# Patient Record
Sex: FEMALE | Race: OTHER | Employment: FULL TIME | URBAN - METROPOLITAN AREA
[De-identification: names, ages, dates, MRNs, and addresses within clinical notes are randomized per-mention and may not be internally consistent; named-entity substitution may affect disease eponyms.]

---

## 2017-03-22 ENCOUNTER — OFFICE VISIT (OUTPATIENT)
Dept: FAMILY MEDICINE CLINIC | Age: 32
End: 2017-03-22
Payer: COMMERCIAL

## 2017-03-22 VITALS
HEIGHT: 62 IN | WEIGHT: 180 LBS | BODY MASS INDEX: 33.13 KG/M2 | RESPIRATION RATE: 18 BRPM | TEMPERATURE: 97.6 F | HEART RATE: 78 BPM | DIASTOLIC BLOOD PRESSURE: 72 MMHG | OXYGEN SATURATION: 99 % | SYSTOLIC BLOOD PRESSURE: 103 MMHG

## 2017-03-22 DIAGNOSIS — R73.01 IMPAIRED FASTING GLUCOSE: ICD-10-CM

## 2017-03-22 DIAGNOSIS — E66.9 OBESITY (BMI 30.0-34.9): ICD-10-CM

## 2017-03-22 DIAGNOSIS — R63.5 UNINTENDED WEIGHT GAIN: Primary | ICD-10-CM

## 2017-03-22 DIAGNOSIS — G89.29 CHRONIC PAIN OF LEFT ANKLE: ICD-10-CM

## 2017-03-22 DIAGNOSIS — Z11.4 SCREENING FOR HIV (HUMAN IMMUNODEFICIENCY VIRUS): ICD-10-CM

## 2017-03-22 DIAGNOSIS — M25.572 CHRONIC PAIN OF LEFT ANKLE: ICD-10-CM

## 2017-03-22 DIAGNOSIS — M89.9 BONE DISORDER: ICD-10-CM

## 2017-03-22 DIAGNOSIS — M12.572 TRAUMATIC ARTHRITIS OF ANKLE, LEFT: ICD-10-CM

## 2017-03-22 PROBLEM — M12.579 TRAUMATIC ARTHRITIS OF ANKLE: Status: ACTIVE | Noted: 2017-03-22

## 2017-03-22 PROCEDURE — 99214 OFFICE O/P EST MOD 30 MIN: CPT | Performed by: FAMILY MEDICINE

## 2017-03-22 RX ORDER — METFORMIN HYDROCHLORIDE 500 MG/1
500 TABLET, EXTENDED RELEASE ORAL
Qty: 30 TABLET | Refills: 3 | Status: SHIPPED | OUTPATIENT
Start: 2017-03-22 | End: 2018-06-12 | Stop reason: ALTCHOICE

## 2017-03-22 ASSESSMENT — ENCOUNTER SYMPTOMS
SHORTNESS OF BREATH: 0
CONSTIPATION: 0
COUGH: 0
DIARRHEA: 0
CHEST TIGHTNESS: 0
NAUSEA: 0
ABDOMINAL PAIN: 0
WHEEZING: 0
ABDOMINAL DISTENTION: 0
VOMITING: 0

## 2017-11-29 DIAGNOSIS — M25.572 CHRONIC PAIN OF LEFT ANKLE: ICD-10-CM

## 2017-11-29 DIAGNOSIS — G89.29 CHRONIC PAIN OF LEFT ANKLE: ICD-10-CM

## 2017-11-29 DIAGNOSIS — M25.472 LEFT ANKLE SWELLING: ICD-10-CM

## 2017-11-29 RX ORDER — IBUPROFEN 800 MG/1
800 TABLET ORAL EVERY 8 HOURS PRN
Qty: 90 TABLET | Refills: 0 | Status: SHIPPED | OUTPATIENT
Start: 2017-11-29 | End: 2018-06-12 | Stop reason: SDUPTHER

## 2017-11-29 NOTE — TELEPHONE ENCOUNTER
----- Message from Sarah Cisse sent at 11/29/2017  2:20 PM EST -----  Contact: patient  Pt is requesting a refill on Ibuprofen.     Walker Ramus on [facility]    Patient home # [phone]

## 2017-12-08 ENCOUNTER — OFFICE VISIT (OUTPATIENT)
Dept: FAMILY MEDICINE CLINIC | Age: 32
End: 2017-12-08
Payer: COMMERCIAL

## 2017-12-08 ENCOUNTER — TELEPHONE (OUTPATIENT)
Dept: FAMILY MEDICINE CLINIC | Age: 32
End: 2017-12-08

## 2017-12-08 VITALS
OXYGEN SATURATION: 100 % | SYSTOLIC BLOOD PRESSURE: 110 MMHG | BODY MASS INDEX: 30.52 KG/M2 | RESPIRATION RATE: 20 BRPM | WEIGHT: 178.8 LBS | HEIGHT: 64 IN | HEART RATE: 60 BPM | TEMPERATURE: 97.1 F | DIASTOLIC BLOOD PRESSURE: 80 MMHG

## 2017-12-08 DIAGNOSIS — Z00.00 ANNUAL PHYSICAL EXAM: Primary | ICD-10-CM

## 2017-12-08 PROCEDURE — 99395 PREV VISIT EST AGE 18-39: CPT | Performed by: NURSE PRACTITIONER

## 2017-12-08 ASSESSMENT — ENCOUNTER SYMPTOMS
BLOOD IN STOOL: 0
ABDOMINAL PAIN: 0
SHORTNESS OF BREATH: 0

## 2017-12-08 NOTE — PROGRESS NOTES
Visit Information    Have you changed or started any medications since your last visit including any over-the-counter medicines, vitamins, or herbal medicines? no   Have you stopped taking any of your medications? Is so, why? -  no  Are you having any side effects from any of your medications? - no    Have you seen any other physician or provider since your last visit?  no   Have you had any other diagnostic tests since your last visit?  no   Have you been seen in the emergency room and/or had an admission in a hospital since we last saw you?  no   Have you had your routine dental cleaning in the past 6 months?  yes -      Do you have an active [employer] account? If no, what is the barrier?   Yes    Patient Care Team:  Rufina George MD as PCP - General (Family Medicine)  Le Wagner as Obstetrician (Certified Nurse Midwife)  Panchito Hammond MD as Surgeon (Orthopedic Surgery)    Medical History Review  Past Medical, Family, and Social History reviewed and does contribute to the patient presenting condition    Health Maintenance   Topic Date Due    HIV screen  02/15/2001    DTaP/Tdap/Td vaccine (1 - Tdap) 02/15/2005    Flu vaccine (1) 09/01/2017    Cervical cancer screen  06/01/2019
110/80   03/22/17 103/72   12/21/16 113/73     /80 Comment: manual  Pulse 60   Temp 97.1 °F (36.2 °C) (Tympanic)   Resp 20   Ht 5' 3.5\" (1.613 m)   Wt 178 lb 12.8 oz (81.1 kg)   LMP 12/05/2017 (Exact Date)   SpO2 100%   BMI 31.18 kg/m²   Lab Results   Component Value Date    WBC 5.6 12/23/2016    HGB 13.8 12/23/2016    HCT 41.0 12/23/2016     12/23/2016    CHOL 173 12/23/2016    TRIG 77 12/23/2016    HDL 44 12/23/2016    ALT 15 12/23/2016    AST 15 12/23/2016     12/23/2016    K 3.9 12/23/2016     12/23/2016    CREATININE 0.55 12/23/2016    BUN 12 12/23/2016    CO2 23 12/23/2016    TSH 0.85 12/23/2016    LABA1C 5.3 12/23/2016    LABMICR 5 02/14/2015     Lab Results   Component Value Date    CALCIUM 9.1 12/23/2016     Lab Results   Component Value Date    LDLCHOLESTEROL 114 12/23/2016         1. Annual physical exam  - Lipid Panel; Future  - Basic Metabolic Panel; Future  - Hemoglobin A1C; Future  - orders ordered last visit reprinted out. Requested Prescriptions      No prescriptions requested or ordered in this encounter       There are no discontinued medications. Domitila Pino received counseling on the following healthy behaviors: nutrition, exercise and weight management  Reviewed prior labs and health maintenance. Continue current medications, diet and exercise. Discussed use, benefit, and side effects of prescribed medications. Barriers to medication compliance addressed. Patient given educational materials - see patient instructions. All patient questions answered. Patient voiced understanding.

## 2017-12-11 ENCOUNTER — HOSPITAL ENCOUNTER (OUTPATIENT)
Age: 32
Discharge: HOME | End: 2017-12-11
Payer: COMMERCIAL

## 2017-12-11 DIAGNOSIS — M12.572 TRAUMATIC ARTHRITIS OF ANKLE, LEFT: ICD-10-CM

## 2017-12-11 DIAGNOSIS — Z11.4 SCREENING FOR HIV (HUMAN IMMUNODEFICIENCY VIRUS): ICD-10-CM

## 2017-12-11 DIAGNOSIS — E55.9 VITAMIN D DEFICIENCY: Primary | ICD-10-CM

## 2017-12-11 DIAGNOSIS — M89.9 BONE DISORDER: ICD-10-CM

## 2017-12-11 DIAGNOSIS — Z00.00 ANNUAL PHYSICAL EXAM: ICD-10-CM

## 2017-12-11 LAB
ANION GAP SERPL CALCULATED.3IONS-SCNC: 12 MMOL/L (ref 9–17)
BUN BLDV-MCNC: 9 MG/DL (ref 6–20)
BUN/CREAT BLD: ABNORMAL (ref 9–20)
CALCIUM SERPL-MCNC: 9.4 MG/DL (ref 8.6–10.4)
CHLORIDE BLD-SCNC: 104 MMOL/L (ref 98–107)
CHOLESTEROL/HDL RATIO: 5.3
CHOLESTEROL: 201 MG/DL
CO2: 25 MMOL/L (ref 20–31)
CREAT SERPL-MCNC: 0.54 MG/DL (ref 0.5–0.9)
ESTIMATED AVERAGE GLUCOSE: 105 MG/DL
GFR AFRICAN AMERICAN: >60 ML/MIN
GFR NON-AFRICAN AMERICAN: >60 ML/MIN
GFR SERPL CREATININE-BSD FRML MDRD: ABNORMAL ML/MIN/{1.73_M2}
GFR SERPL CREATININE-BSD FRML MDRD: ABNORMAL ML/MIN/{1.73_M2}
GLUCOSE BLD-MCNC: 109 MG/DL (ref 70–99)
HBA1C MFR BLD: 5.3 % (ref 4–6)
HDLC SERPL-MCNC: 38 MG/DL
HIV AG/AB: NONREACTIVE
LDL CHOLESTEROL: 115 MG/DL (ref 0–130)
POTASSIUM SERPL-SCNC: 4.3 MMOL/L (ref 3.7–5.3)
SODIUM BLD-SCNC: 141 MMOL/L (ref 135–144)
TRIGL SERPL-MCNC: 241 MG/DL
VITAMIN D 25-HYDROXY: 14.8 NG/ML (ref 30–100)
VLDLC SERPL CALC-MCNC: ABNORMAL MG/DL (ref 1–30)

## 2017-12-11 PROCEDURE — 36415 COLL VENOUS BLD VENIPUNCTURE: CPT

## 2017-12-11 PROCEDURE — 80061 LIPID PANEL: CPT

## 2017-12-11 PROCEDURE — 80048 BASIC METABOLIC PNL TOTAL CA: CPT

## 2017-12-11 PROCEDURE — 83036 HEMOGLOBIN GLYCOSYLATED A1C: CPT

## 2017-12-11 PROCEDURE — 82306 VITAMIN D 25 HYDROXY: CPT

## 2017-12-11 PROCEDURE — 87389 HIV-1 AG W/HIV-1&-2 AB AG IA: CPT

## 2017-12-11 RX ORDER — ERGOCALCIFEROL 1.25 MG/1
50000 CAPSULE ORAL WEEKLY
Qty: 4 CAPSULE | Refills: 2 | Status: SHIPPED | OUTPATIENT
Start: 2017-12-11 | End: 2018-06-12 | Stop reason: ALTCHOICE

## 2018-06-12 ENCOUNTER — OFFICE VISIT (OUTPATIENT)
Dept: FAMILY MEDICINE CLINIC | Age: 33
End: 2018-06-12
Payer: COMMERCIAL

## 2018-06-12 ENCOUNTER — HOSPITAL ENCOUNTER (OUTPATIENT)
Dept: ULTRASOUND IMAGING | Age: 33
Discharge: HOME | End: 2018-06-14
Payer: COMMERCIAL

## 2018-06-12 VITALS
WEIGHT: 164.8 LBS | HEART RATE: 78 BPM | OXYGEN SATURATION: 97 % | TEMPERATURE: 97.5 F | BODY MASS INDEX: 30.33 KG/M2 | SYSTOLIC BLOOD PRESSURE: 111 MMHG | HEIGHT: 62 IN | DIASTOLIC BLOOD PRESSURE: 74 MMHG

## 2018-06-12 DIAGNOSIS — R73.9 HYPERGLYCEMIA: ICD-10-CM

## 2018-06-12 DIAGNOSIS — D17.1 LIPOMA OF TORSO: Primary | ICD-10-CM

## 2018-06-12 DIAGNOSIS — G89.29 CHRONIC PAIN OF LEFT ANKLE: ICD-10-CM

## 2018-06-12 DIAGNOSIS — M25.572 CHRONIC PAIN OF LEFT ANKLE: ICD-10-CM

## 2018-06-12 DIAGNOSIS — Z23 NEED FOR DIPHTHERIA-TETANUS-PERTUSSIS (TDAP) VACCINE: ICD-10-CM

## 2018-06-12 DIAGNOSIS — D17.1 LIPOMA OF TORSO: ICD-10-CM

## 2018-06-12 LAB — HBA1C MFR BLD: 5.2 %

## 2018-06-12 PROCEDURE — 90471 IMMUNIZATION ADMIN: CPT | Performed by: FAMILY MEDICINE

## 2018-06-12 PROCEDURE — 99213 OFFICE O/P EST LOW 20 MIN: CPT | Performed by: FAMILY MEDICINE

## 2018-06-12 PROCEDURE — 76705 ECHO EXAM OF ABDOMEN: CPT

## 2018-06-12 PROCEDURE — 83036 HEMOGLOBIN GLYCOSYLATED A1C: CPT | Performed by: FAMILY MEDICINE

## 2018-06-12 PROCEDURE — 90715 TDAP VACCINE 7 YRS/> IM: CPT | Performed by: FAMILY MEDICINE

## 2018-06-12 RX ORDER — IBUPROFEN 800 MG/1
800 TABLET ORAL EVERY 8 HOURS PRN
Qty: 90 TABLET | Refills: 0 | Status: SHIPPED | OUTPATIENT
Start: 2018-06-12 | End: 2019-02-22 | Stop reason: SDUPTHER

## 2018-06-12 ASSESSMENT — PATIENT HEALTH QUESTIONNAIRE - PHQ9
2. FEELING DOWN, DEPRESSED OR HOPELESS: 0
SUM OF ALL RESPONSES TO PHQ QUESTIONS 1-9: 0
1. LITTLE INTEREST OR PLEASURE IN DOING THINGS: 0
SUM OF ALL RESPONSES TO PHQ9 QUESTIONS 1 & 2: 0

## 2018-06-12 ASSESSMENT — ENCOUNTER SYMPTOMS
ABDOMINAL PAIN: 0
SHORTNESS OF BREATH: 0
COUGH: 0
ABDOMINAL DISTENTION: 0
CHEST TIGHTNESS: 0

## 2018-06-13 PROBLEM — M89.9 BONE DISORDER: Status: RESOLVED | Noted: 2017-03-22 | Resolved: 2018-06-13

## 2018-06-13 PROBLEM — D17.1 LIPOMA OF TORSO: Status: ACTIVE | Noted: 2018-06-13

## 2018-07-17 ENCOUNTER — ANESTHESIA (OUTPATIENT)
Dept: OPERATING ROOM | Age: 33
End: 2018-07-17
Payer: COMMERCIAL

## 2018-07-17 ENCOUNTER — HOSPITAL ENCOUNTER (OUTPATIENT)
Age: 33
Setting detail: OUTPATIENT SURGERY
Discharge: HOME | End: 2018-07-17
Attending: SURGERY | Admitting: SURGERY
Payer: COMMERCIAL

## 2018-07-17 ENCOUNTER — ANESTHESIA EVENT (OUTPATIENT)
Dept: OPERATING ROOM | Age: 33
End: 2018-07-17
Payer: COMMERCIAL

## 2018-07-17 VITALS
WEIGHT: 160 LBS | HEIGHT: 62 IN | HEART RATE: 61 BPM | TEMPERATURE: 96.6 F | SYSTOLIC BLOOD PRESSURE: 113 MMHG | OXYGEN SATURATION: 97 % | RESPIRATION RATE: 13 BRPM | BODY MASS INDEX: 29.44 KG/M2 | DIASTOLIC BLOOD PRESSURE: 68 MMHG

## 2018-07-17 VITALS — TEMPERATURE: 95 F | DIASTOLIC BLOOD PRESSURE: 49 MMHG | SYSTOLIC BLOOD PRESSURE: 87 MMHG | OXYGEN SATURATION: 92 %

## 2018-07-17 DIAGNOSIS — D17.1 LIPOMA OF TORSO: Primary | ICD-10-CM

## 2018-07-17 LAB
-: NORMAL
HCG, PREGNANCY URINE (POC): NEGATIVE

## 2018-07-17 PROCEDURE — 2720000003 HC MISC SUTURE/STAPLES/RELOADS/ETC: Performed by: SURGERY

## 2018-07-17 PROCEDURE — 6360000002 HC RX W HCPCS: Performed by: ANESTHESIOLOGY

## 2018-07-17 PROCEDURE — 3600000002 HC SURGERY LEVEL 2 BASE: Performed by: SURGERY

## 2018-07-17 PROCEDURE — 2500000003 HC RX 250 WO HCPCS: Performed by: NURSE ANESTHETIST, CERTIFIED REGISTERED

## 2018-07-17 PROCEDURE — 2709999900 HC NON-CHARGEABLE SUPPLY: Performed by: SURGERY

## 2018-07-17 PROCEDURE — 6370000000 HC RX 637 (ALT 250 FOR IP): Performed by: ANESTHESIOLOGY

## 2018-07-17 PROCEDURE — 6360000002 HC RX W HCPCS: Performed by: SURGERY

## 2018-07-17 PROCEDURE — 7100000031 HC ASPR PHASE II RECOVERY - ADDTL 15 MIN: Performed by: SURGERY

## 2018-07-17 PROCEDURE — 88304 TISSUE EXAM BY PATHOLOGIST: CPT

## 2018-07-17 PROCEDURE — 7100000030 HC ASPR PHASE II RECOVERY - FIRST 15 MIN: Performed by: SURGERY

## 2018-07-17 PROCEDURE — 3600000012 HC SURGERY LEVEL 2 ADDTL 15MIN: Performed by: SURGERY

## 2018-07-17 PROCEDURE — 2580000003 HC RX 258: Performed by: SURGERY

## 2018-07-17 PROCEDURE — 6360000002 HC RX W HCPCS: Performed by: NURSE ANESTHETIST, CERTIFIED REGISTERED

## 2018-07-17 PROCEDURE — 7100000000 HC PACU RECOVERY - FIRST 15 MIN: Performed by: SURGERY

## 2018-07-17 PROCEDURE — 3700000000 HC ANESTHESIA ATTENDED CARE: Performed by: SURGERY

## 2018-07-17 PROCEDURE — 3700000001 HC ADD 15 MINUTES (ANESTHESIA): Performed by: SURGERY

## 2018-07-17 PROCEDURE — 7100000001 HC PACU RECOVERY - ADDTL 15 MIN: Performed by: SURGERY

## 2018-07-17 PROCEDURE — 2580000003 HC RX 258: Performed by: NURSE ANESTHETIST, CERTIFIED REGISTERED

## 2018-07-17 PROCEDURE — 84703 CHORIONIC GONADOTROPIN ASSAY: CPT

## 2018-07-17 PROCEDURE — 2500000003 HC RX 250 WO HCPCS: Performed by: SURGERY

## 2018-07-17 RX ORDER — SODIUM CHLORIDE, SODIUM LACTATE, POTASSIUM CHLORIDE, CALCIUM CHLORIDE 600; 310; 30; 20 MG/100ML; MG/100ML; MG/100ML; MG/100ML
INJECTION, SOLUTION INTRAVENOUS CONTINUOUS PRN
Status: DISCONTINUED | OUTPATIENT
Start: 2018-07-17 | End: 2018-07-17 | Stop reason: SDUPTHER

## 2018-07-17 RX ORDER — PROPOFOL 10 MG/ML
INJECTION, EMULSION INTRAVENOUS PRN
Status: DISCONTINUED | OUTPATIENT
Start: 2018-07-17 | End: 2018-07-17 | Stop reason: SDUPTHER

## 2018-07-17 RX ORDER — HYDROCODONE BITARTRATE AND ACETAMINOPHEN 5; 325 MG/1; MG/1
1 TABLET ORAL EVERY 6 HOURS PRN
Qty: 20 TABLET | Refills: 0 | Status: SHIPPED | OUTPATIENT
Start: 2018-07-17 | End: 2018-07-24

## 2018-07-17 RX ORDER — LABETALOL HYDROCHLORIDE 5 MG/ML
5 INJECTION, SOLUTION INTRAVENOUS EVERY 10 MIN PRN
Status: DISCONTINUED | OUTPATIENT
Start: 2018-07-17 | End: 2018-07-17 | Stop reason: HOSPADM

## 2018-07-17 RX ORDER — FENTANYL CITRATE 50 UG/ML
INJECTION, SOLUTION INTRAMUSCULAR; INTRAVENOUS PRN
Status: DISCONTINUED | OUTPATIENT
Start: 2018-07-17 | End: 2018-07-17 | Stop reason: SDUPTHER

## 2018-07-17 RX ORDER — ONDANSETRON 2 MG/ML
4 INJECTION INTRAMUSCULAR; INTRAVENOUS
Status: DISCONTINUED | OUTPATIENT
Start: 2018-07-17 | End: 2018-07-17 | Stop reason: HOSPADM

## 2018-07-17 RX ORDER — DIPHENHYDRAMINE HYDROCHLORIDE 50 MG/ML
12.5 INJECTION INTRAMUSCULAR; INTRAVENOUS
Status: DISCONTINUED | OUTPATIENT
Start: 2018-07-17 | End: 2018-07-17 | Stop reason: HOSPADM

## 2018-07-17 RX ORDER — LIDOCAINE HYDROCHLORIDE 10 MG/ML
INJECTION, SOLUTION EPIDURAL; INFILTRATION; INTRACAUDAL; PERINEURAL PRN
Status: DISCONTINUED | OUTPATIENT
Start: 2018-07-17 | End: 2018-07-17 | Stop reason: SDUPTHER

## 2018-07-17 RX ORDER — MIDAZOLAM HYDROCHLORIDE 1 MG/ML
INJECTION INTRAMUSCULAR; INTRAVENOUS PRN
Status: DISCONTINUED | OUTPATIENT
Start: 2018-07-17 | End: 2018-07-17 | Stop reason: SDUPTHER

## 2018-07-17 RX ORDER — MORPHINE SULFATE 2 MG/ML
2 INJECTION, SOLUTION INTRAMUSCULAR; INTRAVENOUS EVERY 5 MIN PRN
Status: DISCONTINUED | OUTPATIENT
Start: 2018-07-17 | End: 2018-07-17 | Stop reason: HOSPADM

## 2018-07-17 RX ORDER — CEFAZOLIN SODIUM 1 G/3ML
INJECTION, POWDER, FOR SOLUTION INTRAMUSCULAR; INTRAVENOUS
Status: DISCONTINUED
Start: 2018-07-17 | End: 2018-07-17 | Stop reason: HOSPADM

## 2018-07-17 RX ORDER — DEXAMETHASONE SODIUM PHOSPHATE 4 MG/ML
INJECTION, SOLUTION INTRA-ARTICULAR; INTRALESIONAL; INTRAMUSCULAR; INTRAVENOUS; SOFT TISSUE PRN
Status: DISCONTINUED | OUTPATIENT
Start: 2018-07-17 | End: 2018-07-17 | Stop reason: SDUPTHER

## 2018-07-17 RX ORDER — ONDANSETRON 2 MG/ML
INJECTION INTRAMUSCULAR; INTRAVENOUS PRN
Status: DISCONTINUED | OUTPATIENT
Start: 2018-07-17 | End: 2018-07-17 | Stop reason: SDUPTHER

## 2018-07-17 RX ORDER — HYDROCODONE BITARTRATE AND ACETAMINOPHEN 5; 325 MG/1; MG/1
1 TABLET ORAL ONCE
Status: COMPLETED | OUTPATIENT
Start: 2018-07-17 | End: 2018-07-17

## 2018-07-17 RX ORDER — MEPERIDINE HYDROCHLORIDE 50 MG/ML
12.5 INJECTION INTRAMUSCULAR; INTRAVENOUS; SUBCUTANEOUS EVERY 5 MIN PRN
Status: DISCONTINUED | OUTPATIENT
Start: 2018-07-17 | End: 2018-07-17 | Stop reason: HOSPADM

## 2018-07-17 RX ORDER — SODIUM CHLORIDE, SODIUM LACTATE, POTASSIUM CHLORIDE, CALCIUM CHLORIDE 600; 310; 30; 20 MG/100ML; MG/100ML; MG/100ML; MG/100ML
INJECTION, SOLUTION INTRAVENOUS CONTINUOUS
Status: DISCONTINUED | OUTPATIENT
Start: 2018-07-17 | End: 2018-07-17 | Stop reason: HOSPADM

## 2018-07-17 RX ORDER — BUPIVACAINE HYDROCHLORIDE 5 MG/ML
INJECTION, SOLUTION EPIDURAL; INTRACAUDAL PRN
Status: DISCONTINUED | OUTPATIENT
Start: 2018-07-17 | End: 2018-07-17 | Stop reason: HOSPADM

## 2018-07-17 RX ADMIN — LIDOCAINE HYDROCHLORIDE 50 MG: 10 INJECTION, SOLUTION EPIDURAL; INFILTRATION; INTRACAUDAL; PERINEURAL at 10:32

## 2018-07-17 RX ADMIN — ONDANSETRON 4 MG: 2 INJECTION INTRAMUSCULAR; INTRAVENOUS at 10:39

## 2018-07-17 RX ADMIN — PROPOFOL 200 MG: 10 INJECTION, EMULSION INTRAVENOUS at 10:32

## 2018-07-17 RX ADMIN — SODIUM CHLORIDE, POTASSIUM CHLORIDE, SODIUM LACTATE AND CALCIUM CHLORIDE: 600; 310; 30; 20 INJECTION, SOLUTION INTRAVENOUS at 10:27

## 2018-07-17 RX ADMIN — FENTANYL CITRATE 100 MCG: 50 INJECTION, SOLUTION INTRAMUSCULAR; INTRAVENOUS at 10:32

## 2018-07-17 RX ADMIN — SODIUM CHLORIDE, POTASSIUM CHLORIDE, SODIUM LACTATE AND CALCIUM CHLORIDE: 600; 310; 30; 20 INJECTION, SOLUTION INTRAVENOUS at 09:20

## 2018-07-17 RX ADMIN — SODIUM CHLORIDE, POTASSIUM CHLORIDE, SODIUM LACTATE AND CALCIUM CHLORIDE: 600; 310; 30; 20 INJECTION, SOLUTION INTRAVENOUS at 10:56

## 2018-07-17 RX ADMIN — MIDAZOLAM 2 MG: 1 INJECTION INTRAMUSCULAR; INTRAVENOUS at 10:27

## 2018-07-17 RX ADMIN — DEXAMETHASONE SODIUM PHOSPHATE 12 MG: 4 INJECTION, SOLUTION INTRAMUSCULAR; INTRAVENOUS at 10:39

## 2018-07-17 RX ADMIN — HYDROCODONE BITARTRATE AND ACETAMINOPHEN 1 TABLET: 5; 325 TABLET ORAL at 12:15

## 2018-07-17 RX ADMIN — HYDROMORPHONE HYDROCHLORIDE 0.5 MG: 1 INJECTION, SOLUTION INTRAMUSCULAR; INTRAVENOUS; SUBCUTANEOUS at 11:41

## 2018-07-17 RX ADMIN — Medication 2 G: at 10:35

## 2018-07-17 ASSESSMENT — PULMONARY FUNCTION TESTS
PIF_VALUE: 14
PIF_VALUE: 4
PIF_VALUE: 17
PIF_VALUE: 1
PIF_VALUE: 15
PIF_VALUE: 2
PIF_VALUE: 18
PIF_VALUE: 17
PIF_VALUE: 14
PIF_VALUE: 24
PIF_VALUE: 15
PIF_VALUE: 15
PIF_VALUE: 16
PIF_VALUE: 18
PIF_VALUE: 16
PIF_VALUE: 17
PIF_VALUE: 15
PIF_VALUE: 15
PIF_VALUE: 14
PIF_VALUE: 1
PIF_VALUE: 9
PIF_VALUE: 19
PIF_VALUE: 17
PIF_VALUE: 1
PIF_VALUE: 18
PIF_VALUE: 15
PIF_VALUE: 1
PIF_VALUE: 14
PIF_VALUE: 29
PIF_VALUE: 15
PIF_VALUE: 16
PIF_VALUE: 15
PIF_VALUE: 0
PIF_VALUE: 10

## 2018-07-17 ASSESSMENT — PAIN DESCRIPTION - PAIN TYPE
TYPE: SURGICAL PAIN
TYPE: SURGICAL PAIN

## 2018-07-17 ASSESSMENT — PAIN SCALES - GENERAL
PAINLEVEL_OUTOF10: 5
PAINLEVEL_OUTOF10: 6
PAINLEVEL_OUTOF10: 0
PAINLEVEL_OUTOF10: 5
PAINLEVEL_OUTOF10: 0

## 2018-07-17 ASSESSMENT — ENCOUNTER SYMPTOMS
STRIDOR: 0
SHORTNESS OF BREATH: 0

## 2018-07-17 ASSESSMENT — LIFESTYLE VARIABLES: SMOKING_STATUS: 0

## 2018-07-17 ASSESSMENT — PAIN DESCRIPTION - LOCATION: LOCATION: ABDOMEN

## 2018-07-17 ASSESSMENT — PAIN DESCRIPTION - DESCRIPTORS: DESCRIPTORS: DISCOMFORT

## 2018-07-17 ASSESSMENT — PAIN - FUNCTIONAL ASSESSMENT: PAIN_FUNCTIONAL_ASSESSMENT: 0-10

## 2018-07-17 NOTE — H&P
HISTORY and [address]       NAME:  Milka Jim  MRN: [id number]   YOB: 1985   Date: 7/17/2018   Age: 29 y.o. Gender: female       COMPLAINT AND PRESENT HISTORY:     Milka Jim is 29 y.o.,  female, here for [address] Right. Pt has a lump/mass to the right  Side of abdomen/flank area that she has had for approx 10 years now. Patient reports noticing the mass due to enlargement around 4 years ago, especially after a weight loss of 14 lbs. Pt reports that the mass has progressively larger and is non- painful. No recent falls or trauma. No redness, swelling or rashes. Pt denies any other symptoms. PAST MEDICAL HISTORY     Past Medical History:   Diagnosis Date    Calcaneus fracture, left 04/2015    Chronic pain of left ankle 12/21/2016    Left ankle swelling 12/21/2016    Low grade squamous intraepithelial lesion (LGSIL) on cervicovaginal cytologic smear 12/20/2016    Last PAP 6/1/16 in [address] is within normal limits     Navicular fracture, foot 04/2015    Non morbid obesity due to excess calories 12/21/2016    Obesity (BMI 30.0-34.9) 12/21/2016    Unintended weight gain 12/21/2016         SURGICAL HISTORY       Past Surgical History:   Procedure Laterality Date    FOOT SURGERY Left 04/2015    GALLBLADDER SURGERY  2010         SOCIAL HISTORY       Social History     Social History    Marital status: Single     Spouse name: N/A    Number of children: N/A    Years of education: N/A     Social History Main Topics    Smoking status: Never Smoker    Smokeless tobacco: Never Used    Alcohol use No    Drug use: No    Sexual activity: Not on file     Other Topics Concern    Not on file     Social History Narrative    No narrative on file           REVIEW OF SYSTEMS      No Known Allergies    No current facility-administered medications on file prior to encounter.       Current Outpatient Prescriptions on File Prior to

## 2018-07-17 NOTE — ANESTHESIA PRE PROCEDURE
Department of Anesthesiology  Preprocedure Note       Name:  Naida Mejia   Age:  29 y.o.  :  1985                                          MRN:  [id number]         Date:  2018      Surgeon: Ila Merino):  Fatimah Robin DO    Procedure: Procedure(s):  EXCISION LIPOMA ABDOMINAL WALL    Medications prior to admission:   Prior to Admission medications    Medication Sig Start Date End Date Taking? Authorizing Provider   ibuprofen (ADVIL;MOTRIN) 800 MG tablet Take 1 tablet by mouth every 8 hours as needed for Pain (with food) 18  Yes Walter Booker MD       Current medications:    Current Facility-Administered Medications   Medication Dose Route Frequency Provider Last Rate Last Dose    lactated ringers infusion   Intravenous Continuous Fatimah Robin  mL/hr at 18 0920      ceFAZolin (ANCEF) 2 g in dextrose 5 % 50 mL IVPB  2 g Intravenous Once Fatimah Robin DO           Allergies:  No Known Allergies    Problem List:    Patient Active Problem List   Diagnosis Code    Low grade squamous intraepithelial lesion (LGSIL) on cervicovaginal cytologic smear R87.612, R87.622    Left ankle swelling M25.472    Obesity (BMI 30.0-34. 9) E66.9    Chronic pain of left ankle M25.572, G89.29    Hyperglycemia R73.9    Impaired fasting glucose R73.01    Traumatic arthritis of ankle M12.579    Vitamin D deficiency E55.9    Lipoma of torso right flank D17.1       Past Medical History:        Diagnosis Date    Calcaneus fracture, left 2015    Chronic pain of left ankle 2016    Left ankle swelling 2016    Low grade squamous intraepithelial lesion (LGSIL) on cervicovaginal cytologic smear 2016    Last PAP 16 in [address] is within normal limits     Navicular fracture, foot 2015    Non morbid obesity due to excess calories 2016    Obesity (BMI 30.0-34.9) 2016    Unintended weight gain 2016       Past Surgical History:        Procedure

## 2018-07-19 LAB — SURGICAL PATHOLOGY REPORT: NORMAL

## 2018-09-12 ENCOUNTER — OFFICE VISIT (OUTPATIENT)
Dept: FAMILY MEDICINE CLINIC | Age: 33
End: 2018-09-12
Payer: COMMERCIAL

## 2018-09-12 VITALS
HEART RATE: 70 BPM | BODY MASS INDEX: 31.65 KG/M2 | DIASTOLIC BLOOD PRESSURE: 72 MMHG | SYSTOLIC BLOOD PRESSURE: 102 MMHG | WEIGHT: 172 LBS | HEIGHT: 62 IN | OXYGEN SATURATION: 100 %

## 2018-09-12 DIAGNOSIS — E66.9 OBESITY (BMI 30.0-34.9): ICD-10-CM

## 2018-09-12 DIAGNOSIS — G89.29 CHRONIC PAIN OF LEFT ANKLE: ICD-10-CM

## 2018-09-12 DIAGNOSIS — R73.9 HYPERGLYCEMIA: ICD-10-CM

## 2018-09-12 DIAGNOSIS — R73.01 IMPAIRED FASTING GLUCOSE: ICD-10-CM

## 2018-09-12 DIAGNOSIS — E55.9 VITAMIN D DEFICIENCY: ICD-10-CM

## 2018-09-12 DIAGNOSIS — M25.472 LEFT ANKLE SWELLING: ICD-10-CM

## 2018-09-12 DIAGNOSIS — M25.572 CHRONIC PAIN OF LEFT ANKLE: ICD-10-CM

## 2018-09-12 DIAGNOSIS — M12.572 TRAUMATIC ARTHRITIS OF LEFT ANKLE: Primary | ICD-10-CM

## 2018-09-12 PROCEDURE — 99214 OFFICE O/P EST MOD 30 MIN: CPT | Performed by: FAMILY MEDICINE

## 2018-09-12 RX ORDER — METFORMIN HYDROCHLORIDE 500 MG/1
500 TABLET, EXTENDED RELEASE ORAL
Qty: 30 TABLET | Refills: 3 | Status: SHIPPED | OUTPATIENT
Start: 2018-09-12 | End: 2020-05-25

## 2018-09-12 RX ORDER — CHOLECALCIFEROL (VITAMIN D3) 50 MCG
2000 TABLET ORAL DAILY
Qty: 30 TABLET | Refills: 3 | Status: SHIPPED | OUTPATIENT
Start: 2018-09-12 | End: 2021-03-18 | Stop reason: SDUPTHER

## 2018-09-12 RX ORDER — LIDOCAINE 40 MG/G
CREAM TOPICAL
Qty: 120 G | Refills: 3 | Status: SHIPPED | OUTPATIENT
Start: 2018-09-12 | End: 2020-05-25

## 2018-09-12 ASSESSMENT — ENCOUNTER SYMPTOMS
ABDOMINAL DISTENTION: 0
CONSTIPATION: 0
VOMITING: 0
CHEST TIGHTNESS: 0
DIARRHEA: 0
WHEEZING: 0
COUGH: 0
ABDOMINAL PAIN: 0
SHORTNESS OF BREATH: 0
NAUSEA: 0

## 2018-09-12 NOTE — PROGRESS NOTES
cannot exercise, but has been eating healthier. Both parents have diabetes. Her brother has prediabetes. She tried keto diet before, and she lost a lot of weight, now she gained the weight back  Body mass index is 31.46 kg/m². Wt Readings from Last 3 Encounters:   09/12/18 172 lb (78 kg)   07/17/18 160 lb (72.6 kg)   06/12/18 164 lb 12.8 oz (74.8 kg)     CT 12/9/2016 with multiple changes and possible collection    [address]  \"IMPRESSION:    Talonavicular effusion distending the dorsal joint capsule with fluid accumulation, localized phlegmon, and/or inflammatory changes adjacent to the distended capsule and course of the extensor tendons over the dorsal aspect of the hindfoot. Correlate for   any localized signs of infection or inflammatory process    The process appears to be extend proximally and medially along the medial aspect of the talonavicular joint where there is some prominent heterotopic ossification or several small joint bodies with similar changes seen more along the plantar lateral   aspect of the healed navicular fracture fragment    Hardware in place reducing the old navicular and cuboid fractures with chronic remodeling and several small free bone fragments and/or heterotopic ossification seen.  Mild to moderate arthritic changes and irregularity of the articular surfaces at the   adjacent calcaneocuboid and calcaneonavicular and talonavicular joints, also prominent arthritic changes seen along the middle subtalar joint involving the middle facet and sustentaculum    Sclerotic appearance of the dorsal and lateral extent of the reticular, unclear if that relates to healed fracture or possible AVN, less likely chronic osteomyelitis    Grossly maintained ankle mortise and tibial plafond and talar dome and structural ligaments of the ankle with only mild effusion or capsular thickening greater medially    Grossly maintained mid foot Lisfranc articulation and Psychiatric/Behavioral: Negative for dysphoric mood and sleep disturbance. The patient is not nervous/anxious.          -vital signs stable and within normal limits except Obesity per BMI. /72   Pulse 70   Ht 5' 2\" (1.575 m)   Wt 172 lb (78 kg)   SpO2 100%   BMI 31.46 kg/m²        Physical Exam   Constitutional: She is oriented to person, place, and time. She appears well-developed and well-nourished. No distress. HENT:   Head: Normocephalic and atraumatic. Mouth/Throat: Oropharynx is clear and moist. No oropharyngeal exudate. Eyes: Conjunctivae and EOM are normal. Right eye exhibits no discharge. Left eye exhibits no discharge. No scleral icterus. Neck: Normal range of motion. Neck supple. No thyromegaly present. Cardiovascular: Normal rate, regular rhythm, normal heart sounds and intact distal pulses. Pulmonary/Chest: Effort normal and breath sounds normal. No respiratory distress. She has no wheezes. She has no rales. She exhibits no tenderness. Abdominal: Soft. Bowel sounds are normal. She exhibits no distension. There is no hepatosplenomegaly. There is no tenderness. Obese abdomen. Musculoskeletal: She exhibits tenderness. She exhibits no edema. Left ankle: She exhibits decreased range of motion and swelling. Tenderness. Lateral malleolus tenderness found. Left foot: There is decreased range of motion, tenderness, bony tenderness and deformity. On the dorsum of the left foot, noticed old surgical scar. Deformity and prominence of the dorsum of the foot noted. Neurological: She is alert and oriented to person, place, and time. No cranial nerve deficit. She exhibits normal muscle tone. Skin: Skin is warm and dry. No rash noted. She is not diaphoretic. Psychiatric: Her behavior is normal. Judgment and thought content normal. Her mood appears anxious. Nursing note and vitals reviewed.       Most recent labs reviewed with the patient and all questions fully answered. Mild hyperglycemia  A1c within normal limits   hyperlipidemia  hypertriglyceridemia  Vitamin D low          Lab Results   Component Value Date    WBC 5.6 12/23/2016    HGB 13.8 12/23/2016    HCT 41.0 12/23/2016    MCV 88.5 12/23/2016     12/23/2016       Lab Results   Component Value Date     12/11/2017    K 4.3 12/11/2017     12/11/2017    CO2 25 12/11/2017    BUN 9 12/11/2017    CREATININE 0.54 12/11/2017    GLUCOSE 109 12/11/2017    GLUCOSE 83 11/19/2011    CALCIUM 9.4 12/11/2017        Lab Results   Component Value Date    LABA1C 5.2 06/12/2018     Lab Results   Component Value Date     12/11/2017         Lab Results   Component Value Date    ALT 15 12/23/2016    AST 15 12/23/2016    ALKPHOS 79 12/23/2016    BILITOT 0.41 12/23/2016       Lab Results   Component Value Date    TSH 0.85 12/23/2016       Lab Results   Component Value Date    CHOL 201 (H) 12/11/2017    CHOL 173 12/23/2016    CHOL 123 02/14/2015     Lab Results   Component Value Date    TRIG 241 (H) 12/11/2017    TRIG 77 12/23/2016    TRIG 41 02/14/2015     Lab Results   Component Value Date    HDL 38 (L) 12/11/2017    HDL 44 12/23/2016    HDL 42 02/14/2015     Lab Results   Component Value Date    LDLCHOLESTEROL 115 12/11/2017    LDLCHOLESTEROL 114 12/23/2016    LDLCHOLESTEROL 73 02/14/2015       Lab Results   Component Value Date    CHOLHDLRATIO 5.3 (H) 12/11/2017    CHOLHDLRATIO 3.9 12/23/2016    CHOLHDLRATIO 2.9 02/14/2015       Lab Results   Component Value Date    LABA1C 5.2 06/12/2018       Lab Results   Component Value Date    WGSBXAPR23 414 02/14/2015       No results found for: FOLATE    Lab Results   Component Value Date    IRON 41 02/14/2015    TIBC 316 02/14/2015    FERRITIN 13 02/14/2015       Lab Results   Component Value Date    VITD25 14.8 (L) 12/11/2017         ASSESSMENT AND PLAN      1. Traumatic arthritis of left ankle    - Handicap Placard MISC; by Does not apply route .  Can't walk greater than 200 feet. Expires in 5 years. Dispense: 1 each; Refill: 0  - [facility]  - diclofenac sodium (VOLTAREN) 1 % GEL; Apply 2 g topically 4 times daily as needed for Pain  Dispense: 1 Tube; Refill: 3  - lidocaine (LMX) 4 % cream; Apply 2-3 times a day as needed for pain  Dispense: 120 g; Refill: 3  - [facility] Anastasio Bloch, DPM  - XR FOOT LEFT (MIN 3 VIEWS); Future  - XR ANKLE LEFT (MIN 3 VIEWS); Future    2. Left ankle swelling  - Handicap Placard MISC; by Does not apply route . Can't walk greater than 200 feet. Expires in 5 years. Dispense: 1 each; Refill: 0  - [facility]  - diclofenac sodium (VOLTAREN) 1 % GEL; Apply 2 g topically 4 times daily as needed for Pain  Dispense: 1 Tube; Refill: 3  - lidocaine (LMX) 4 % cream; Apply 2-3 times a day as needed for pain  Dispense: 120 g; Refill: 3  - [facility]ast[facility] Bl[facility], DPM  - XR FOOT LEFT (MIN 3 VIEWS); Future  - XR ANKLE LEFT (MIN 3 VIEWS); Future    3. Chronic pain of left ankle  - Handicap Placard MISC; by Does not apply route . Can't walk greater than 200 feet. Expires in 5 years. Dispense: 1 each; Refill: 0  - [facility]  - diclofenac sodium (VOLTAREN) 1 % GEL; Apply 2 g topically 4 times daily as needed for Pain  Dispense: 1 Tube; Refill: 3  - lidocaine (LMX) 4 % cream; Apply 2-3 times a day as needed for pain  Dispense: 120 g; Refill: 3  - [facility] D[facility] Anastasio Bloch, DPM  - XR FOOT LEFT (MIN 3 VIEWS); Future  - XR ANKLE LEFT (MIN 3 VIEWS); Future    4. Obesity (BMI 30.0-34.9)  worsening    - start metFORMIN (GLUCOPHAGE-XR) 500 MG extended release tablet; Take 1 tablet by mouth daily (with breakfast)  Dispense: 30 tablet; Refill: 3    Patient was asked about her current diet and exercise habits, and personalized advice was provided regarding recommended lifestyle changes.   Patient's comorbid health conditions associated with elevated BMI discussed conventional weight loss and Healthy diet and regular exercise. BP: 102/72 Blood pressure is normal. Treatment plan consists of No treatment change needed. Hyperlipidemia, life style modification: diet and exercise. Lab Results   Component Value Date    LDLCHOLESTEROL 115 12/11/2017    (goal LDL reduction with dx if diabetes is 50% LDL reduction)        Negative depression screening. PHQ Scores 6/12/2018 12/21/2016   PHQ2 Score 0 0   PHQ9 Score 0 0     Interpretation of Total Score Depression Severity: 1-4 = Minimal depression, 5-9 = Mild depression, 10-14 = Moderate depression, 15-19 = Moderately severe depression, 20-27 = Severe depression      The patient's past medical, surgical, social, and family history as well as her   current medications and allergies were reviewed as documented in today's encounter. Medications, labs, diagnostic studies, consultations and follow-up as documented in this encounter. Return in about 3 months (around 12/12/2018) for ANNUAL EXAM/physical 30 mins. Patient was seen with total face to face time of  25 minutes. More than 50% of this visit was counseling and education. Future Appointments  Date Time Provider Verna Diop   9/24/2018 3:15 PM Jessica Benitez, PT [facility] [address] [place] [address]   12/13/2018 3:00 PM Marcello Oliva MD [facility]     This note was completed by using the assistance of a speech-recognition program. However, inadvertent computerized transcription errors may be present. Although every effort was made to ensure accuracy, no guarantees can be provided that every mistake has been identified and corrected by editing.     Electronically signed by Marcello Oliva MD on 9/17/2018 at 10:15 PM

## 2018-09-12 NOTE — PROGRESS NOTES
Visit Information    Have you changed or started any medications since your last visit including any over-the-counter medicines, vitamins, or herbal medicines? no   Are you having any side effects from any of your medications? -  no  Have you stopped taking any of your medications? Is so, why? -  no    Have you seen any other physician or provider since your last visit? Yes - Records Obtained  Have you had any other diagnostic tests since your last visit? Yes - Records Obtained  Have you been seen in the emergency room and/or had an admission to a hospital since we last saw you? No  Have you had your routine dental cleaning in the past 6 months? no    Have you activated your [employer] account? If not, what are your barriers?  Yes     Patient Care Team:  Michelle Lazaro MD as PCP - General (Family Medicine)  Rebecca Dejesus as Obstetrician (Certified Nurse Midwife)  Claudio Morales MD as Surgeon (Orthopedic Surgery)    Medical History Review  Past Medical, Family, and Social History reviewed and does contribute to the patient presenting condition    Health Maintenance   Topic Date Due    Flu vaccine (1) 09/01/2018    Cervical cancer screen  06/01/2019    A1C test (Diabetic or Prediabetic)  06/12/2019    DTaP/Tdap/Td vaccine (2 - Td) 06/12/2028    HIV screen  Completed

## 2018-09-24 ENCOUNTER — HOSPITAL ENCOUNTER (OUTPATIENT)
Dept: PHYSICAL THERAPY | Age: 33
Setting detail: THERAPIES SERIES
Discharge: HOME | End: 2018-09-24
Payer: COMMERCIAL

## 2018-09-24 PROCEDURE — 97161 PT EVAL LOW COMPLEX 20 MIN: CPT

## 2018-09-24 PROCEDURE — G0283 ELEC STIM OTHER THAN WOUND: HCPCS

## 2018-09-24 ASSESSMENT — PAIN DESCRIPTION - ORIENTATION: ORIENTATION: LEFT

## 2018-09-24 ASSESSMENT — PAIN SCALES - GENERAL: PAINLEVEL_OUTOF10: 10

## 2018-09-24 ASSESSMENT — PAIN DESCRIPTION - LOCATION: LOCATION: ANKLE

## 2018-09-24 ASSESSMENT — PAIN DESCRIPTION - DESCRIPTORS: DESCRIPTORS: SHARP

## 2018-09-24 ASSESSMENT — PAIN DESCRIPTION - FREQUENCY: FREQUENCY: INTERMITTENT

## 2018-09-24 NOTE — PROGRESS NOTES
[] [address] [facility] and  Therapy  [address]  Phone: [phone]  Fax: [phone]  [] [facility] and Therapy  [address] [place] [address] [address]  Phone: [phone]  Fax: [phone]  [] [address] [place] [address] and Therapy  [address] [facility]  Phone: [phone]  Fax: [phone]      THERAPY RESPONSIBILITY OF CARE TRANSFER FORM       PATIENT NAME: Tsering Bernal  MRN: [id number]   : 1985      TRANSFERRING FACILITY:    [] [facility]   [x] 1 [facility]   []  [facility]   [] [facility]   [] [facility]     [] [facility]    [] Other:       ACCEPTING FACILITY   [] [facility]   [x] 1 [facility]   []  [facility]   [] [facility]   [] [facility]      [] [facility]    [] Other:          REASON FOR TRANSFER: orders for dry needling      TRANSFER OF CARE:    I am transferring the care of the above patient to: Janki Cali, JORGE Ochoa PT  2018      ACCEPTANCE OF CARE:     I am accepting the care of the above patient.  Janki Cali, PT

## 2018-09-24 NOTE — PROGRESS NOTES
patient can walk more than 20 min  Short term goal 2: increase L ankle AROM to full   Short term goal 3: increase L ankle 5/5  Short term goal 4: indep with HEP  Long term goals  Time Frame for Long term goals : 12 visits  Long term goal 1: improve optimal from 19/20 to 4/20(walk long distance 4 hopping 5 running 5 jumping 5)  Patient Goals   Patient goals : relieve L ankle pain     Treatment Charges: Minutes Units   []  Ultrasound     [x]  Electrical-Stim 20 1   []  Iontophoresis     []  Traction     []  Massage       [x]  Eval 20 1   []  Gait     []  Ther Exercise       []  Manual Therapy       []  Ther Activities       []  Aquatics     []  Vasopneumatic Device     []  Neuro Re-Ed       []  Other       Total Treatment Time: 40 2        Therapy Time   Individual Concurrent Group Co-treatment   Time In 1520         Time Out 1600         Minutes 40           Patient Goals:relieve L ankle pain    Comments/Assessment:    Rehab Potential:  [x] Good  [] Fair  [] Poor   Suggested Professional Referral:  [x] No  [] Yes:  Barriers to Goal Achievement:  [] No  [x] Yes:chronic  Domestic Concerns:  [x] No  [] Yes:    Treatment Plan:  [x] Therapeutic Exercise    [x] Modalities:  [] Therapeutic Activity    [] Ultrasound  [x] Electrical Stimulation  [] Gait Training     []Massage       [] Lumbar/Cervical Traction  [] Neuromuscular Re-education [x] Cold/hotpack [x] Other: Dry Needling  [] Instruction in HEP     [] Work Conditioning                                         [] Manual Therapy             [] Aquatic Therapy               [] Iontophoresis: 4 mg/mL      Dexamethasone Sodium      Phosphate 40-80mAmin (Allergies Reviewed)     Frequency:        2   X/wk x      6    wk's      [x] Plans/Goals, Risk/Benefits discussed with pt/family  Comprehension of Education [x] yes  [] Needs Review  Pt/Family Education: [x] Verbal  [x] Demo  [] Written    More objective information is available upon request.  Thank you for this referral.        Medicare/Regulatory Requirements:  I have reviewed this plan of care and certify a need for   Medically necessary rehabilitation services.   [] Physician Signature    Date:     Electronically signed by: Baldomero Cazares, [address] [facility] [address] [facility]heatherOste[facility] Hospital [facility] [place], [address] [facility]  Phone [phone]  Fax [phone]

## 2018-09-26 ENCOUNTER — HOSPITAL ENCOUNTER (OUTPATIENT)
Dept: PHYSICAL THERAPY | Age: 33
Setting detail: THERAPIES SERIES
Discharge: HOME | End: 2018-09-26
Payer: COMMERCIAL

## 2018-09-26 PROCEDURE — 97110 THERAPEUTIC EXERCISES: CPT

## 2018-09-26 PROCEDURE — 97140 MANUAL THERAPY 1/> REGIONS: CPT

## 2018-10-01 ENCOUNTER — HOSPITAL ENCOUNTER (OUTPATIENT)
Dept: PHYSICAL THERAPY | Age: 33
Setting detail: THERAPIES SERIES
Discharge: HOME | End: 2018-10-01
Payer: COMMERCIAL

## 2018-10-01 PROCEDURE — 97140 MANUAL THERAPY 1/> REGIONS: CPT

## 2018-10-01 PROCEDURE — 97110 THERAPEUTIC EXERCISES: CPT

## 2018-10-03 ENCOUNTER — HOSPITAL ENCOUNTER (OUTPATIENT)
Dept: PHYSICAL THERAPY | Age: 33
Setting detail: THERAPIES SERIES
Discharge: HOME | End: 2018-10-03
Payer: COMMERCIAL

## 2018-10-03 PROCEDURE — 97140 MANUAL THERAPY 1/> REGIONS: CPT

## 2018-10-03 PROCEDURE — 97110 THERAPEUTIC EXERCISES: CPT

## 2018-10-03 ASSESSMENT — PAIN DESCRIPTION - LOCATION: LOCATION: ANKLE

## 2018-10-03 ASSESSMENT — PAIN DESCRIPTION - FREQUENCY: FREQUENCY: INTERMITTENT

## 2018-10-03 ASSESSMENT — PAIN SCALES - GENERAL: PAINLEVEL_OUTOF10: 8

## 2018-10-03 ASSESSMENT — PAIN DESCRIPTION - ORIENTATION: ORIENTATION: LEFT

## 2018-10-03 ASSESSMENT — PAIN DESCRIPTION - DESCRIPTORS: DESCRIPTORS: SHARP

## 2018-10-05 ASSESSMENT — PAIN DESCRIPTION - LOCATION
LOCATION: ANKLE
LOCATION: ANKLE

## 2018-10-05 ASSESSMENT — PAIN DESCRIPTION - DESCRIPTORS: DESCRIPTORS: SHARP

## 2018-10-05 ASSESSMENT — PAIN DESCRIPTION - FREQUENCY: FREQUENCY: INTERMITTENT

## 2018-10-05 ASSESSMENT — PAIN DESCRIPTION - ORIENTATION
ORIENTATION: LEFT
ORIENTATION: LEFT

## 2018-10-05 ASSESSMENT — PAIN SCALES - GENERAL
PAINLEVEL_OUTOF10: 6
PAINLEVEL_OUTOF10: 4

## 2018-10-05 NOTE — PROGRESS NOTES
stiffness/weakness L ankle difficulty walking  Prognosis: Good  REQUIRES PT FOLLOW UP: Yes     Goals:  Short term goals  Time Frame for Short term goals: 6 visits  Short term goal 1: decrease pain L ankle by 50% so patient can walk more than 20 min  Short term goal 2: increase L ankle AROM to full   Short term goal 3: increase L ankle 5/5  Short term goal 4: indep with HEP  Long term goals  Time Frame for Long term goals : 12 visits  Long term goal 1: improve optimal from 19/20 to 4/20(walk long distance 4 hopping 5 running 5 jumping 5)  Patient Goals   Patient goals : relieve L ankle pain    Plan:    Plan  Times per week: 2x/week  Plan weeks: 6 weeks  Current Treatment Recommendations: Strengthening, ROM, Integrated Dry Needling, Modalities, Home Exercise Program  Plan Comment: patient to be transfered to [facility] Elena LPT for dry needling        Therapy Time   Individual Concurrent Group Co-treatment   Time In  1700         Time Out  1735         Minutes  35             Treatment Charges: Minutes Units   []  Ultrasound     []  Electrical-Stim     []  Iontophoresis     []  Traction     []  Massage       []  Eval     []  Gait     [x]  Ther Exercise 20   1   []  Manual Therapy  15 1    []  Ther Activities       []  Aquatics     []  Vasopneumatic Device     []  Neuro Re-Ed       []  Other       Total Treatment Time: 35  2            Librado Parker, PT

## 2018-10-08 ENCOUNTER — HOSPITAL ENCOUNTER (OUTPATIENT)
Dept: PHYSICAL THERAPY | Age: 33
Setting detail: THERAPIES SERIES
Discharge: HOME | End: 2018-10-08
Payer: COMMERCIAL

## 2018-10-08 PROCEDURE — 97110 THERAPEUTIC EXERCISES: CPT

## 2018-10-08 PROCEDURE — 97140 MANUAL THERAPY 1/> REGIONS: CPT

## 2018-10-10 ENCOUNTER — HOSPITAL ENCOUNTER (OUTPATIENT)
Dept: PHYSICAL THERAPY | Age: 33
Setting detail: THERAPIES SERIES
Discharge: HOME | End: 2018-10-10
Payer: COMMERCIAL

## 2018-10-10 PROCEDURE — 97140 MANUAL THERAPY 1/> REGIONS: CPT

## 2018-10-10 PROCEDURE — 97110 THERAPEUTIC EXERCISES: CPT

## 2018-10-12 NOTE — PROGRESS NOTES
Physical Therapy  Daily Treatment Note  Date: 10/10/2018  Patient Name: Pascual Aly  [facility]  :   1985     Subjective:   General  Chart Reviewed: Yes  Additional Pertinent Hx: pain L ankle since  when she broke it and had surgery  Family / Caregiver Present: No  Referring Practitioner: Dr Nikos Logan  PT Visit Information  Onset Date: 04/05/15  PT Insurance Information: BCBS  Total # of Visits Approved: 12  Total # of Visits to Date: 6  Subjective  Subjective: Patient with significantly more decreased WB today, moderate antalgic gait. Patient stated that she had increased symptoms \"due to the weather\" but was also more active at work and had to do more WB at work today. Patient is seeing a podiatrist/foot surgeon next week Friday for a second opinion. Patient told to bring information regarding this physician and when she was seeing him, plan PN to update this physician and referring physician on status. Pain Screening  Patient Currently in Pain: Yes  Pain Assessment  Pain Assessment: 0-10  Pain Level: 6 (8/10 with increased WB)  Pain Location: Ankle  Pain Orientation: Left  Patient's Stated Pain Goal: No pain  Vital Signs  Patient Currently in Pain: Yes       Treatment Activities:   Manual therapy  Joint mobilization: tibial anterior and posterior glide  PROM: toe flexion, ankle PF  Other: dry needling 1/2\" needles, deep fibular, tibial homeostatic points, symptomatic points along previous incisional area 3x each lateral to dorsal incision, just inferior to lateral incision  Exercises  Exercise 1: standing L heel cord stretch 3x30\"  Exercise 2: cross over toe extensor stretch 5 x 15\"  Exercise 3: cross over anterior ankle stretch 5 x 15\"  REVIEWED 4 way ankle purple 2x10, toe yoga 2x10, pop bottle stretch with patient pending pain levels.   Written HEP enclosed in chart.       Assessment:   Conditions Requiring Skilled Therapeutic Intervention  Body structures, Functions, Activity

## 2018-10-17 ENCOUNTER — HOSPITAL ENCOUNTER (OUTPATIENT)
Dept: PHYSICAL THERAPY | Age: 33
Setting detail: THERAPIES SERIES
Discharge: HOME | End: 2018-10-17
Payer: COMMERCIAL

## 2018-10-17 PROCEDURE — 97140 MANUAL THERAPY 1/> REGIONS: CPT

## 2018-10-17 PROCEDURE — 97110 THERAPEUTIC EXERCISES: CPT

## 2018-10-19 ENCOUNTER — HOSPITAL ENCOUNTER (OUTPATIENT)
Dept: GENERAL RADIOLOGY | Age: 33
Discharge: HOME | End: 2018-10-21
Payer: COMMERCIAL

## 2018-10-19 ENCOUNTER — HOSPITAL ENCOUNTER (OUTPATIENT)
Age: 33
Discharge: HOME | End: 2018-10-21
Payer: COMMERCIAL

## 2018-10-19 DIAGNOSIS — M12.572 TRAUMATIC ARTHRITIS OF LEFT ANKLE: ICD-10-CM

## 2018-10-19 DIAGNOSIS — G89.29 CHRONIC PAIN OF LEFT ANKLE: ICD-10-CM

## 2018-10-19 DIAGNOSIS — M25.572 CHRONIC PAIN OF LEFT ANKLE: ICD-10-CM

## 2018-10-19 DIAGNOSIS — M25.472 LEFT ANKLE SWELLING: ICD-10-CM

## 2018-10-19 PROCEDURE — 73610 X-RAY EXAM OF ANKLE: CPT

## 2018-10-19 PROCEDURE — 73630 X-RAY EXAM OF FOOT: CPT

## 2018-10-19 ASSESSMENT — PAIN DESCRIPTION - ORIENTATION: ORIENTATION: LEFT

## 2018-10-19 ASSESSMENT — PAIN DESCRIPTION - FREQUENCY: FREQUENCY: INTERMITTENT

## 2018-10-19 ASSESSMENT — PAIN DESCRIPTION - DESCRIPTORS: DESCRIPTORS: SHARP

## 2018-10-19 ASSESSMENT — PAIN DESCRIPTION - LOCATION: LOCATION: ANKLE

## 2018-10-19 ASSESSMENT — PAIN SCALES - GENERAL: PAINLEVEL_OUTOF10: 4

## 2019-02-22 ENCOUNTER — OFFICE VISIT (OUTPATIENT)
Dept: FAMILY MEDICINE CLINIC | Age: 34
End: 2019-02-22
Payer: COMMERCIAL

## 2019-02-22 VITALS
BODY MASS INDEX: 32.2 KG/M2 | WEIGHT: 175 LBS | DIASTOLIC BLOOD PRESSURE: 78 MMHG | SYSTOLIC BLOOD PRESSURE: 118 MMHG | OXYGEN SATURATION: 98 % | HEART RATE: 62 BPM | TEMPERATURE: 98.2 F | HEIGHT: 62 IN

## 2019-02-22 DIAGNOSIS — E78.5 HYPERLIPIDEMIA WITH TARGET LDL LESS THAN 100: ICD-10-CM

## 2019-02-22 DIAGNOSIS — G89.29 CHRONIC PAIN OF LEFT ANKLE: ICD-10-CM

## 2019-02-22 DIAGNOSIS — Z00.00 ANNUAL PHYSICAL EXAM: Primary | ICD-10-CM

## 2019-02-22 DIAGNOSIS — R63.5 UNINTENDED WEIGHT GAIN: ICD-10-CM

## 2019-02-22 DIAGNOSIS — R53.82 CHRONIC FATIGUE: ICD-10-CM

## 2019-02-22 DIAGNOSIS — R73.01 IMPAIRED FASTING GLUCOSE: ICD-10-CM

## 2019-02-22 DIAGNOSIS — M25.572 CHRONIC PAIN OF LEFT ANKLE: ICD-10-CM

## 2019-02-22 DIAGNOSIS — E55.9 VITAMIN D DEFICIENCY: ICD-10-CM

## 2019-02-22 PROBLEM — D17.1 LIPOMA OF TORSO: Status: RESOLVED | Noted: 2018-06-13 | Resolved: 2019-02-22

## 2019-02-22 LAB — HBA1C MFR BLD: 4.8 %

## 2019-02-22 PROCEDURE — 83036 HEMOGLOBIN GLYCOSYLATED A1C: CPT | Performed by: FAMILY MEDICINE

## 2019-02-22 PROCEDURE — 99395 PREV VISIT EST AGE 18-39: CPT | Performed by: FAMILY MEDICINE

## 2019-02-22 RX ORDER — IBUPROFEN 800 MG/1
800 TABLET ORAL EVERY 8 HOURS PRN
Qty: 90 TABLET | Refills: 0 | Status: SHIPPED | OUTPATIENT
Start: 2019-02-22 | End: 2020-05-27 | Stop reason: SDUPTHER

## 2019-02-22 ASSESSMENT — PATIENT HEALTH QUESTIONNAIRE - PHQ9
2. FEELING DOWN, DEPRESSED OR HOPELESS: 0
SUM OF ALL RESPONSES TO PHQ9 QUESTIONS 1 & 2: 0
SUM OF ALL RESPONSES TO PHQ QUESTIONS 1-9: 0
1. LITTLE INTEREST OR PLEASURE IN DOING THINGS: 0
SUM OF ALL RESPONSES TO PHQ QUESTIONS 1-9: 0

## 2019-02-22 ASSESSMENT — ENCOUNTER SYMPTOMS
WHEEZING: 0
SINUS PAIN: 0
COUGH: 0
RHINORRHEA: 0
CHEST TIGHTNESS: 0
CONSTIPATION: 0
DIARRHEA: 0
ABDOMINAL DISTENTION: 0
NAUSEA: 0
SHORTNESS OF BREATH: 0
ABDOMINAL PAIN: 0
BACK PAIN: 0
VOMITING: 0

## 2019-03-16 ENCOUNTER — HOSPITAL ENCOUNTER (OUTPATIENT)
Age: 34
Discharge: HOME | End: 2019-03-16
Payer: COMMERCIAL

## 2019-03-16 DIAGNOSIS — R63.5 UNINTENDED WEIGHT GAIN: ICD-10-CM

## 2019-03-16 DIAGNOSIS — E78.5 HYPERLIPIDEMIA WITH TARGET LDL LESS THAN 100: ICD-10-CM

## 2019-03-16 DIAGNOSIS — R53.82 CHRONIC FATIGUE: ICD-10-CM

## 2019-03-16 DIAGNOSIS — E55.9 VITAMIN D DEFICIENCY: ICD-10-CM

## 2019-03-16 DIAGNOSIS — K75.2 NONSPECIFIC REACTIVE HEPATITIS: Primary | ICD-10-CM

## 2019-03-16 LAB
ALBUMIN SERPL-MCNC: 4.2 G/DL (ref 3.5–5.2)
ALBUMIN/GLOBULIN RATIO: ABNORMAL (ref 1–2.5)
ALP BLD-CCNC: 84 U/L (ref 35–104)
ALT SERPL-CCNC: 41 U/L (ref 5–33)
ANION GAP SERPL CALCULATED.3IONS-SCNC: 9 MMOL/L (ref 9–17)
AST SERPL-CCNC: 24 U/L
BILIRUB SERPL-MCNC: 0.35 MG/DL (ref 0.3–1.2)
BUN BLDV-MCNC: 9 MG/DL (ref 6–20)
BUN/CREAT BLD: ABNORMAL (ref 9–20)
CALCIUM SERPL-MCNC: 9.6 MG/DL (ref 8.6–10.4)
CHLORIDE BLD-SCNC: 105 MMOL/L (ref 98–107)
CHOLESTEROL/HDL RATIO: 4.5
CHOLESTEROL: 174 MG/DL
CO2: 26 MMOL/L (ref 20–31)
CREAT SERPL-MCNC: 0.55 MG/DL (ref 0.5–0.9)
GFR AFRICAN AMERICAN: >60 ML/MIN
GFR NON-AFRICAN AMERICAN: >60 ML/MIN
GFR SERPL CREATININE-BSD FRML MDRD: ABNORMAL ML/MIN/{1.73_M2}
GFR SERPL CREATININE-BSD FRML MDRD: ABNORMAL ML/MIN/{1.73_M2}
GLUCOSE BLD-MCNC: 102 MG/DL (ref 70–99)
HCT VFR BLD CALC: 43.3 % (ref 36–46)
HDLC SERPL-MCNC: 39 MG/DL
HEMOGLOBIN: 14.6 G/DL (ref 12–16)
LDL CHOLESTEROL: 113 MG/DL (ref 0–130)
MCH RBC QN AUTO: 31.1 PG (ref 26–34)
MCHC RBC AUTO-ENTMCNC: 33.6 G/DL (ref 31–37)
MCV RBC AUTO: 92.6 FL (ref 80–100)
NRBC AUTOMATED: NORMAL PER 100 WBC
PDW BLD-RTO: 13 % (ref 11.5–14.9)
PLATELET # BLD: 245 K/UL (ref 150–450)
PMV BLD AUTO: 9.4 FL (ref 6–12)
POTASSIUM SERPL-SCNC: 4.4 MMOL/L (ref 3.7–5.3)
RBC # BLD: 4.68 M/UL (ref 4–5.2)
SODIUM BLD-SCNC: 140 MMOL/L (ref 135–144)
TOTAL PROTEIN: 7.6 G/DL (ref 6.4–8.3)
TRIGL SERPL-MCNC: 108 MG/DL
TSH SERPL DL<=0.05 MIU/L-ACNC: 1.24 MIU/L (ref 0.3–5)
VITAMIN D 25-HYDROXY: 17.5 NG/ML (ref 30–100)
VLDLC SERPL CALC-MCNC: ABNORMAL MG/DL (ref 1–30)
WBC # BLD: 4.3 K/UL (ref 3.5–11)

## 2019-03-16 PROCEDURE — 80053 COMPREHEN METABOLIC PANEL: CPT

## 2019-03-16 PROCEDURE — 80061 LIPID PANEL: CPT

## 2019-03-16 PROCEDURE — 82306 VITAMIN D 25 HYDROXY: CPT

## 2019-03-16 PROCEDURE — 85027 COMPLETE CBC AUTOMATED: CPT

## 2019-03-16 PROCEDURE — 36415 COLL VENOUS BLD VENIPUNCTURE: CPT

## 2019-03-16 PROCEDURE — 84443 ASSAY THYROID STIM HORMONE: CPT

## 2019-03-19 ENCOUNTER — TELEPHONE (OUTPATIENT)
Dept: FAMILY MEDICINE CLINIC | Age: 34
End: 2019-03-19

## 2019-03-19 DIAGNOSIS — E55.9 VITAMIN D DEFICIENCY: Primary | ICD-10-CM

## 2019-03-19 RX ORDER — ERGOCALCIFEROL 1.25 MG/1
50000 CAPSULE ORAL WEEKLY
Qty: 12 CAPSULE | Refills: 0 | Status: SHIPPED | OUTPATIENT
Start: 2019-03-19 | End: 2019-06-01 | Stop reason: SDUPTHER

## 2019-03-20 ENCOUNTER — HOSPITAL ENCOUNTER (OUTPATIENT)
Age: 34
Discharge: HOME | End: 2019-03-20
Payer: COMMERCIAL

## 2019-03-20 DIAGNOSIS — K75.2 NONSPECIFIC REACTIVE HEPATITIS: ICD-10-CM

## 2019-03-20 LAB
HAV IGM SER IA-ACNC: NONREACTIVE
HEPATITIS B CORE IGM ANTIBODY: NONREACTIVE
HEPATITIS B SURFACE ANTIGEN: NONREACTIVE
HEPATITIS C ANTIBODY: NONREACTIVE

## 2019-03-20 PROCEDURE — 80074 ACUTE HEPATITIS PANEL: CPT

## 2019-03-20 PROCEDURE — 36415 COLL VENOUS BLD VENIPUNCTURE: CPT

## 2019-03-23 ENCOUNTER — HOSPITAL ENCOUNTER (OUTPATIENT)
Dept: ULTRASOUND IMAGING | Age: 34
Discharge: HOME | End: 2019-03-25
Payer: COMMERCIAL

## 2019-03-23 DIAGNOSIS — K75.2 NONSPECIFIC REACTIVE HEPATITIS: ICD-10-CM

## 2019-03-23 PROCEDURE — 76705 ECHO EXAM OF ABDOMEN: CPT

## 2019-06-01 DIAGNOSIS — E55.9 VITAMIN D DEFICIENCY: ICD-10-CM

## 2019-06-03 RX ORDER — ERGOCALCIFEROL 1.25 MG/1
CAPSULE ORAL
Qty: 12 CAPSULE | Refills: 0 | Status: SHIPPED | OUTPATIENT
Start: 2019-06-03 | End: 2020-05-27 | Stop reason: ALTCHOICE

## 2019-06-03 NOTE — TELEPHONE ENCOUNTER
Please Approve or Refuse.   Send to Pharmacy per Pt's Request:      Next Visit Date:  Visit date not found   Last Visit Date: 2/22/2019    Hemoglobin A1C (%)   Date Value   02/22/2019 4.8   06/12/2018 5.2   12/11/2017 5.3             ( goal A1C is < 7)   BP Readings from Last 3 Encounters:   02/22/19 118/78   09/12/18 102/72   07/17/18 113/68          (goal 120/80)  BUN   Date Value Ref Range Status   03/16/2019 9 6 - 20 mg/dL Final     CREATININE   Date Value Ref Range Status   03/16/2019 0.55 0.50 - 0.90 mg/dL Final     Potassium   Date Value Ref Range Status   03/16/2019 4.4 3.7 - 5.3 mmol/L Final

## 2019-06-03 NOTE — TELEPHONE ENCOUNTER
Yes I did send on 3/19/19  She just called us after 2 mo! FYI  Notes recorded by Genaro Apodaca MD on 3/16/2019 at 8:21 PM EDT  ABNORMAL. Please notify patient. Vitamin D very low, new prescription for high-dose vitamin D weekly for 3 months sent at the pharmacy, needs to take it with food. Improved lipids  Mildly increased Blood Glucose, low carb diet advised, and stop pop! One liver test a bit high, will need more testing for her liver, I will order ultrasound liver and hepatitis panel.  Otherwise labs within normal limits

## 2020-05-25 ENCOUNTER — HOSPITAL ENCOUNTER (EMERGENCY)
Age: 35
Discharge: HOME | End: 2020-05-25
Attending: EMERGENCY MEDICINE
Payer: COMMERCIAL

## 2020-05-25 VITALS
DIASTOLIC BLOOD PRESSURE: 80 MMHG | HEIGHT: 62 IN | TEMPERATURE: 98.6 F | WEIGHT: 170 LBS | RESPIRATION RATE: 16 BRPM | SYSTOLIC BLOOD PRESSURE: 124 MMHG | BODY MASS INDEX: 31.28 KG/M2 | OXYGEN SATURATION: 100 % | HEART RATE: 74 BPM

## 2020-05-25 PROCEDURE — 6360000002 HC RX W HCPCS: Performed by: NURSE PRACTITIONER

## 2020-05-25 PROCEDURE — 90471 IMMUNIZATION ADMIN: CPT | Performed by: NURSE PRACTITIONER

## 2020-05-25 PROCEDURE — 99282 EMERGENCY DEPT VISIT SF MDM: CPT

## 2020-05-25 PROCEDURE — 90715 TDAP VACCINE 7 YRS/> IM: CPT | Performed by: NURSE PRACTITIONER

## 2020-05-25 RX ORDER — CIPROFLOXACIN 250 MG/1
250 TABLET, FILM COATED ORAL 2 TIMES DAILY
Qty: 14 TABLET | Refills: 0 | Status: SHIPPED | OUTPATIENT
Start: 2020-05-25 | End: 2020-06-01

## 2020-05-25 RX ADMIN — TETANUS TOXOID, REDUCED DIPHTHERIA TOXOID AND ACELLULAR PERTUSSIS VACCINE, ADSORBED 0.5 ML: 5; 2.5; 8; 8; 2.5 SUSPENSION INTRAMUSCULAR at 20:25

## 2020-05-25 ASSESSMENT — ENCOUNTER SYMPTOMS
VOMITING: 0
COLOR CHANGE: 1
SHORTNESS OF BREATH: 0
NAUSEA: 0
SINUS PRESSURE: 0
COUGH: 0

## 2020-05-25 ASSESSMENT — PAIN SCALES - GENERAL: PAINLEVEL_OUTOF10: 10

## 2020-05-26 ENCOUNTER — TELEPHONE (OUTPATIENT)
Dept: FAMILY MEDICINE CLINIC | Age: 35
End: 2020-05-26

## 2020-05-26 NOTE — ED PROVIDER NOTES
[facility] ED  EMERGENCY DEPARTMENT ENCOUNTER      Pt Name: rPashant Blanco  MRN: [id number]  [place] 1985  Date of evaluation: 5/25/20  CHIEF COMPLAINT       Chief Complaint   Patient presents with    Laceration     stepped on a nail     HISTORY OF PRESENT ILLNESS   Presents to the emergency room via private auto with puncture wound to the right foot. She states that yesterday she stepped on a nail which went through the rubber sole of her tennis shoe. Today she feels as if she has some swelling around the puncture site. No purulent drainage or fevers. She complains of throbbing pain. No additional injuries. The history is provided by the patient. REVIEW OF SYSTEMS     Review of Systems   Constitutional: Negative for activity change and fever. HENT: Negative for congestion and sinus pressure. Respiratory: Negative for cough and shortness of breath. Cardiovascular: Negative for chest pain and palpitations. Gastrointestinal: Negative for nausea and vomiting. Musculoskeletal: Positive for arthralgias and gait problem. Negative for myalgias. Skin: Positive for color change and wound. Neurological: Negative for dizziness and headaches. Psychiatric/Behavioral: Negative for confusion and decreased concentration.      PASTMEDICAL HISTORY     Past Medical History:   Diagnosis Date    Calcaneus fracture, left 04/2015    Chronic pain of left ankle 12/21/2016    Hyperlipidemia with target LDL less than 100 2/22/2019    Left ankle swelling 12/21/2016    Lipoma of torso right flank 6/13/2018    Low grade squamous intraepithelial lesion (LGSIL) on cervicovaginal cytologic smear 12/20/2016    Last PAP 6/1/16 in [facility] is within normal limits     Low grade squamous intraepithelial lesion (LGSIL) on cervicovaginal cytologic smear 12/20/2016    Last PAP 6/1/16 in [facility] is within normal limits     Navicular fracture, foot 04/2015    Non morbid obesity due to excess calories (BOOSTRIX) injection 0.5 mL    ciprofloxacin (CIPRO) 250 MG tablet     Sig: Take 1 tablet by mouth 2 times daily for 7 days     Dispense:  14 tablet     Refill:  0       FINAL IMPRESSION      1.  Puncture wound          DISPOSITION/PLAN   DISPOSITION Decision To Discharge 05/25/2020 08:30:54 PM      PATIENT REFERRED TO:   Bandar Orellana MD  [address] [place] [address].  [address] [place] [address]  [phone]    Schedule an appointment as soon as possible for a visit       [facility] ED  [address]  [phone]    If symptoms worsen      DISCHARGE MEDICATIONS:     New Prescriptions    CIPROFLOXACIN (CIPRO) 250 MG TABLET    Take 1 tablet by mouth 2 times daily for 7 days       CRITICAL CARE TIME       Please note that portions of this note were completed with a voice recognition program.      Catalina CANO [id number], APRN - CNP  05/25/20 2036

## 2020-05-26 NOTE — TELEPHONE ENCOUNTER
[address]  ED Follow up Call    Reason for ED visit:           Joaquín Rojas , this is Hiren Mcmanus from Dr. Nathan Shah office, just calling to see how you are doing after your recent ED visit. Did you receive discharge instructions? Yes  Do you understand the discharge instructions? Yes  Did the ED give you any new prescriptions? Yes  Were you able to fill your prescriptions? Yes      Do you have one of our red, yellow and green  Zone sheets that help you to determine when you should go to the ED? Yes      Do you need or want to make a follow up appt with your PCP? Yes    Do you have any further needs in the home i.e. Equipment?   No        FU appts/Provider:    Future Appointments   Date Time Provider Verna Diop   5/27/2020  8:45 AM ERIC Ureña CNP fp [facility]   6/3/2020  4:15 PM [facility]

## 2020-05-27 ENCOUNTER — OFFICE VISIT (OUTPATIENT)
Dept: FAMILY MEDICINE CLINIC | Age: 35
End: 2020-05-27
Payer: COMMERCIAL

## 2020-05-27 VITALS
HEART RATE: 67 BPM | BODY MASS INDEX: 32.2 KG/M2 | DIASTOLIC BLOOD PRESSURE: 86 MMHG | SYSTOLIC BLOOD PRESSURE: 124 MMHG | WEIGHT: 175 LBS | OXYGEN SATURATION: 98 % | HEIGHT: 62 IN

## 2020-05-27 LAB — HBA1C MFR BLD: 5.3 %

## 2020-05-27 PROCEDURE — 83036 HEMOGLOBIN GLYCOSYLATED A1C: CPT | Performed by: FAMILY MEDICINE

## 2020-05-27 PROCEDURE — 99214 OFFICE O/P EST MOD 30 MIN: CPT | Performed by: FAMILY MEDICINE

## 2020-05-27 RX ORDER — IBUPROFEN 800 MG/1
800 TABLET ORAL EVERY 8 HOURS PRN
Qty: 90 TABLET | Refills: 0 | Status: SHIPPED | OUTPATIENT
Start: 2020-05-27 | End: 2020-05-27 | Stop reason: SDUPTHER

## 2020-05-27 RX ORDER — IBUPROFEN 800 MG/1
800 TABLET ORAL EVERY 8 HOURS PRN
Qty: 90 TABLET | Refills: 0 | Status: SHIPPED | OUTPATIENT
Start: 2020-05-27 | End: 2020-09-01 | Stop reason: SDUPTHER

## 2020-05-27 SDOH — ECONOMIC STABILITY: FOOD INSECURITY: WITHIN THE PAST 12 MONTHS, YOU WORRIED THAT YOUR FOOD WOULD RUN OUT BEFORE YOU GOT MONEY TO BUY MORE.: NEVER TRUE

## 2020-05-27 SDOH — ECONOMIC STABILITY: INCOME INSECURITY: HOW HARD IS IT FOR YOU TO PAY FOR THE VERY BASICS LIKE FOOD, HOUSING, MEDICAL CARE, AND HEATING?: NOT HARD AT ALL

## 2020-05-27 SDOH — ECONOMIC STABILITY: FOOD INSECURITY: WITHIN THE PAST 12 MONTHS, THE FOOD YOU BOUGHT JUST DIDN'T LAST AND YOU DIDN'T HAVE MONEY TO GET MORE.: NEVER TRUE

## 2020-05-27 SDOH — ECONOMIC STABILITY: TRANSPORTATION INSECURITY
IN THE PAST 12 MONTHS, HAS THE LACK OF TRANSPORTATION KEPT YOU FROM MEDICAL APPOINTMENTS OR FROM GETTING MEDICATIONS?: NO

## 2020-05-27 SDOH — ECONOMIC STABILITY: TRANSPORTATION INSECURITY
IN THE PAST 12 MONTHS, HAS LACK OF TRANSPORTATION KEPT YOU FROM MEETINGS, WORK, OR FROM GETTING THINGS NEEDED FOR DAILY LIVING?: NO

## 2020-05-27 ASSESSMENT — PATIENT HEALTH QUESTIONNAIRE - PHQ9
SUM OF ALL RESPONSES TO PHQ9 QUESTIONS 1 & 2: 0
SUM OF ALL RESPONSES TO PHQ QUESTIONS 1-9: 0
2. FEELING DOWN, DEPRESSED OR HOPELESS: 0
1. LITTLE INTEREST OR PLEASURE IN DOING THINGS: 0
SUM OF ALL RESPONSES TO PHQ QUESTIONS 1-9: 0

## 2020-05-27 NOTE — PROGRESS NOTES
Visit Information    Have you changed or started any medications since your last visit including any over-the-counter medicines, vitamins, or herbal medicines? no   Are you having any side effects from any of your medications? -  no  Have you stopped taking any of your medications? Is so, why? -  no    Have you seen any other physician or provider since your last visit? No  Have you had any other diagnostic tests since your last visit? Yes - Records Obtained  Have you been seen in the emergency room and/or had an admission to a hospital since we last saw you? Yes - Records Obtained  Have you had your routine dental cleaning in the past 6 months? no    Have you activated your [employer] account? If not, what are your barriers?  Yes     Patient Care Team:  Cynthia Osullivan MD as PCP - General (Family Medicine)  Cynthia Osullivan MD as PCP - [facility]  Bruna Andersen as Obstetrician (Certified Nurse Midwife)  Joseph Montague MD as Surgeon (Orthopedic Surgery)    Medical History Review  Past Medical, Family, and Social History reviewed and does contribute to the patient presenting condition    Health Maintenance   Topic Date Due    Varicella vaccine (1 of 2 - 2-dose childhood series) 02/15/1987    Cervical cancer screen  06/01/2019    A1C test (Diabetic or Prediabetic)  02/22/2020    Flu vaccine (Season Ended) 09/01/2020    DTaP/Tdap/Td vaccine (3 - Td) 05/25/2030    HIV screen  Completed    Hepatitis A vaccine  Aged Out    Hepatitis B vaccine  Aged Out    Hib vaccine  Aged Out    Meningococcal (ACWY) vaccine  Aged Out    Pneumococcal 0-64 years Vaccine  Aged Out

## 2020-05-27 NOTE — PATIENT INSTRUCTIONS
pillow anytime you sit or lie down during the next 3 days. Try to keep it above the level of your heart. This will help reduce swelling. When should you call for help? Call your doctor now or seek immediate medical care if:  · You have new pain, or the pain gets worse. · The skin near the wound is cold or pale or changes color. · You have tingling, weakness, or numbness near the wound. · The wound starts to bleed, and blood soaks through the bandage. Oozing small amounts of blood is normal.  · You have symptoms of infection, such as:  ? Increased pain, swelling, warmth, or redness. ? Red streaks leading from the wound. ? Pus draining from the wound. ? A fever. Watch closely for changes in your health, and be sure to contact your doctor if:  · You do not get better as expected. Where can you learn more? Go to [email or web address]://[email or web address] org and sign in to your Fanmode account. Enter  in the [employer] box to learn more about \"Wound Check: Care Instructions. \"     If you do not have an account, please click on the \"Sign Up Now\" link. Current as of: June 26, 2019               Content Version: 12.5  © 9489-2522 Healthwise, Incorporated. Care instructions adapted under license by [facility] ([facility]). If you have questions about a medical condition or this instruction, always ask your healthcare professional. [place] [address] any warranty or liability for your use of this information.

## 2020-05-27 NOTE — PROGRESS NOTES
03/16/2019    AST 24 03/16/2019    ALKPHOS 84 03/16/2019    BILITOT 0.35 03/16/2019     Lab Results   Component Value Date    TSH 1.24 03/16/2019     Lab Results   Component Value Date    CHOL 174 03/16/2019    CHOL 201 (H) 12/11/2017    CHOL 173 12/23/2016     Lab Results   Component Value Date    TRIG 108 03/16/2019    TRIG 241 (H) 12/11/2017    TRIG 77 12/23/2016     Lab Results   Component Value Date    HDL 39 (L) 03/16/2019    HDL 38 (L) 12/11/2017    HDL 44 12/23/2016     Lab Results   Component Value Date    LDLCHOLESTEROL 113 03/16/2019    LDLCHOLESTEROL 115 12/11/2017    LDLCHOLESTEROL 114 12/23/2016     Lab Results   Component Value Date    CHOLHDLRATIO 4.5 03/16/2019    CHOLHDLRATIO 5.3 (H) 12/11/2017    CHOLHDLRATIO 3.9 12/23/2016     Lab Results   Component Value Date    LABA1C 5.3 05/27/2020      Lab Results   Component Value Date    ODCIQOHV96 414 02/14/2015     No results found for: FOLATE  Lab Results   Component Value Date    VITD25 17.5 (L) 03/16/2019     ASSESSMENT/PLAN:  1. Injury of right foot, subsequent encounter  Previous  Still swollen  Evaluate for FB or soft tissue injury  Off work for a few more days. - XR FOOT RIGHT (2 VIEWS); Future  - ibuprofen (ADVIL;MOTRIN) 800 MG tablet; Take 1 tablet by mouth every 8 hours as needed for Pain (with food)  Dispense: 90 tablet; Refill: 0    2. Puncture wound  On cipro  Get XR done    - XR FOOT RIGHT (2 VIEWS); Future  - ibuprofen (ADVIL;MOTRIN) 800 MG tablet; Take 1 tablet by mouth every 8 hours as needed for Pain (with food)  Dispense: 90 tablet; Refill: 0    3. Hyperlipidemia with target LDL less than 100  Ongoing  For lipid testing  - Lipid, Fasting; Future    4. Vitamin D deficiency  Ongoing  Due for vitamin D level test  - Vitamin D 25 Hydroxy; Future    5. Hyperglycemia  Valuate for diabetes  - POCT glycosylated hemoglobin (Hb A1C)    6.  Need for varicella vaccine  Recommended to check varicella titers  - Varicella Zoster Antibody, IgG; Future    7. Preventative health care  Recommended  - CBC Auto Differential; Future  - Comprehensive Metabolic Panel, Fasting; Future  - Urinalysis Reflex to Culture; Future    8. Screening for endocrine disorder  Recommended  - TSH without Reflex; Future     Orders Placed This Encounter   Procedures    XR FOOT RIGHT (2 VIEWS)     Standing Status:   Future     Standing Expiration Date:   5/27/2021     Order Specific Question:   Reason for exam:     Answer:   foot pain    CBC Auto Differential     Standing Status:   Future     Standing Expiration Date:   5/27/2021    Comprehensive Metabolic Panel, Fasting     Standing Status:   Future     Standing Expiration Date:   5/27/2021    Lipid, Fasting     Standing Status:   Future     Standing Expiration Date:   5/27/2021    Vitamin D 25 Hydroxy     Standing Status:   Future     Standing Expiration Date:   5/27/2021    Urinalysis Reflex to Culture     Standing Status:   Future     Standing Expiration Date:   5/27/2021     Order Specific Question:   SPECIFY(EX-CATH,MIDSTREAM,CYSTO,ETC)? Answer:   midstream    TSH without Reflex     Standing Status:   Future     Standing Expiration Date:   5/27/2021    Varicella Zoster Antibody, IgG     Standing Status:   Future     Standing Expiration Date:   5/27/2021    POCT glycosylated hemoglobin (Hb A1C)     Orders Placed This Encounter   Medications    ibuprofen (ADVIL;MOTRIN) 800 MG tablet     Sig: Take 1 tablet by mouth every 8 hours as needed for Pain (with food)     Dispense:  90 tablet     Refill:  0      Health Maintenance Due   Topic Date Due    Varicella vaccine (1 of 2 - 2-dose childhood series) 02/15/1987    Cervical cancer screen  06/01/2019    A1C test (Diabetic or Prediabetic)  02/22/2020      Return for Appt w/ Dr. Alyssa Atwood, Worsening Sx's.   Horacio Acevedo received counseling on the following healthy behaviors: nutrition, exercise and medication adherence  Reviewed prior labs and health maintenance  Continue current

## 2020-05-28 NOTE — ED PROVIDER NOTES
eMERGENCY dEPARTMENT eNCOUnter   Independent Attestation     Pt Name: Yasmine Mireles  MRN: [id number]  [place] 1985  Date of evaluation: 5/27/20     Yasmine Mireles is a 30 y.o. female with CC: Laceration (stepped on a nail)      Based on the medical record the care appears appropriate. I was personally available for consultation in the Emergency Department.     Lynda Guillen MD  Attending Emergency Physician                    Lynda Guillen MD  05/27/20 [id number]

## 2020-05-30 ENCOUNTER — HOSPITAL ENCOUNTER (OUTPATIENT)
Age: 35
Discharge: HOME | End: 2020-05-30
Payer: COMMERCIAL

## 2020-05-30 LAB
-: ABNORMAL
ABSOLUTE EOS #: 0.1 K/UL (ref 0–0.4)
ABSOLUTE IMMATURE GRANULOCYTE: NORMAL K/UL (ref 0–0.3)
ABSOLUTE LYMPH #: 1.5 K/UL (ref 1–4.8)
ABSOLUTE MONO #: 0.3 K/UL (ref 0.1–1.3)
ALBUMIN SERPL-MCNC: 4.3 G/DL (ref 3.5–5.2)
ALBUMIN/GLOBULIN RATIO: ABNORMAL (ref 1–2.5)
ALP BLD-CCNC: 79 U/L (ref 35–104)
ALT SERPL-CCNC: 31 U/L (ref 5–33)
AMORPHOUS: ABNORMAL
ANION GAP SERPL CALCULATED.3IONS-SCNC: 12 MMOL/L (ref 9–17)
AST SERPL-CCNC: 18 U/L
BACTERIA: ABNORMAL
BASOPHILS # BLD: 1 % (ref 0–2)
BASOPHILS ABSOLUTE: 0 K/UL (ref 0–0.2)
BILIRUB SERPL-MCNC: 0.3 MG/DL (ref 0.3–1.2)
BILIRUBIN URINE: NEGATIVE
BUN BLDV-MCNC: 11 MG/DL (ref 6–20)
BUN/CREAT BLD: ABNORMAL (ref 9–20)
CALCIUM SERPL-MCNC: 9.4 MG/DL (ref 8.6–10.4)
CASTS UA: ABNORMAL /LPF
CHLORIDE BLD-SCNC: 104 MMOL/L (ref 98–107)
CHOLESTEROL, FASTING: 184 MG/DL
CHOLESTEROL/HDL RATIO: 4.6
CO2: 22 MMOL/L (ref 20–31)
COLOR: YELLOW
COMMENT UA: ABNORMAL
CREAT SERPL-MCNC: 0.5 MG/DL (ref 0.5–0.9)
CRYSTALS, UA: ABNORMAL /HPF
DIFFERENTIAL TYPE: NORMAL
EOSINOPHILS RELATIVE PERCENT: 1 % (ref 0–4)
EPITHELIAL CELLS UA: ABNORMAL /HPF
GFR AFRICAN AMERICAN: >60 ML/MIN
GFR NON-AFRICAN AMERICAN: >60 ML/MIN
GFR SERPL CREATININE-BSD FRML MDRD: ABNORMAL ML/MIN/{1.73_M2}
GFR SERPL CREATININE-BSD FRML MDRD: ABNORMAL ML/MIN/{1.73_M2}
GLUCOSE FASTING: 102 MG/DL (ref 70–99)
GLUCOSE URINE: NEGATIVE
HCT VFR BLD CALC: 41.9 % (ref 36–46)
HDLC SERPL-MCNC: 40 MG/DL
HEMOGLOBIN: 14.1 G/DL (ref 12–16)
IMMATURE GRANULOCYTES: NORMAL %
KETONES, URINE: NEGATIVE
LDL CHOLESTEROL: 124 MG/DL (ref 0–130)
LEUKOCYTE ESTERASE, URINE: ABNORMAL
LYMPHOCYTES # BLD: 27 % (ref 24–44)
MCH RBC QN AUTO: 30.6 PG (ref 26–34)
MCHC RBC AUTO-ENTMCNC: 33.6 G/DL (ref 31–37)
MCV RBC AUTO: 91.1 FL (ref 80–100)
MONOCYTES # BLD: 6 % (ref 1–7)
MUCUS: ABNORMAL
NITRITE, URINE: NEGATIVE
NRBC AUTOMATED: NORMAL PER 100 WBC
OTHER OBSERVATIONS UA: ABNORMAL
PDW BLD-RTO: 13.3 % (ref 11.5–14.9)
PH UA: 6 (ref 5–8)
PLATELET # BLD: 252 K/UL (ref 150–450)
PLATELET ESTIMATE: NORMAL
PMV BLD AUTO: 9.3 FL (ref 6–12)
POTASSIUM SERPL-SCNC: 3.8 MMOL/L (ref 3.7–5.3)
PROTEIN UA: NEGATIVE
RBC # BLD: 4.6 M/UL (ref 4–5.2)
RBC # BLD: NORMAL 10*6/UL
RBC UA: ABNORMAL /HPF
RENAL EPITHELIAL, UA: ABNORMAL /HPF
SEG NEUTROPHILS: 65 % (ref 36–66)
SEGMENTED NEUTROPHILS ABSOLUTE COUNT: 3.6 K/UL (ref 1.3–9.1)
SODIUM BLD-SCNC: 138 MMOL/L (ref 135–144)
SPECIFIC GRAVITY UA: 1.02 (ref 1–1.03)
TOTAL PROTEIN: 7.7 G/DL (ref 6.4–8.3)
TRICHOMONAS: ABNORMAL
TRIGLYCERIDE, FASTING: 101 MG/DL
TSH SERPL DL<=0.05 MIU/L-ACNC: 1.19 MIU/L (ref 0.3–5)
TURBIDITY: CLEAR
URINE HGB: ABNORMAL
UROBILINOGEN, URINE: NORMAL
VITAMIN D 25-HYDROXY: 17.3 NG/ML (ref 30–100)
VLDLC SERPL CALC-MCNC: ABNORMAL MG/DL (ref 1–30)
WBC # BLD: 5.5 K/UL (ref 3.5–11)
WBC # BLD: NORMAL 10*3/UL
WBC UA: ABNORMAL /HPF
YEAST: ABNORMAL

## 2020-05-30 PROCEDURE — 86787 VARICELLA-ZOSTER ANTIBODY: CPT

## 2020-05-30 PROCEDURE — 81001 URINALYSIS AUTO W/SCOPE: CPT

## 2020-05-30 PROCEDURE — 87086 URINE CULTURE/COLONY COUNT: CPT

## 2020-05-30 PROCEDURE — 82306 VITAMIN D 25 HYDROXY: CPT

## 2020-05-30 PROCEDURE — 80061 LIPID PANEL: CPT

## 2020-05-30 PROCEDURE — 80053 COMPREHEN METABOLIC PANEL: CPT

## 2020-05-30 PROCEDURE — 84443 ASSAY THYROID STIM HORMONE: CPT

## 2020-05-30 PROCEDURE — 36415 COLL VENOUS BLD VENIPUNCTURE: CPT

## 2020-05-30 PROCEDURE — 85025 COMPLETE CBC W/AUTO DIFF WBC: CPT

## 2020-05-31 LAB
CULTURE: NO GROWTH
Lab: NORMAL
SPECIMEN DESCRIPTION: NORMAL

## 2020-05-31 RX ORDER — ERGOCALCIFEROL 1.25 MG/1
50000 CAPSULE ORAL WEEKLY
Qty: 12 CAPSULE | Refills: 0 | Status: SHIPPED | OUTPATIENT
Start: 2020-05-31 | End: 2021-03-18 | Stop reason: SDUPTHER

## 2020-06-01 LAB — VZV IGG SER QL IA: 2.19

## 2020-06-01 NOTE — RESULT ENCOUNTER NOTE
Mychart comment sent to patient.   Immune to chickenpox, normal  Future Appointments  6/3/2020   4:15 PM    Charles Vieira MD     [facility]          [address] [facility]

## 2020-06-09 ENCOUNTER — TELEPHONE (OUTPATIENT)
Dept: ADMINISTRATIVE | Age: 35
End: 2020-06-09

## 2020-09-01 ENCOUNTER — OFFICE VISIT (OUTPATIENT)
Dept: FAMILY MEDICINE CLINIC | Age: 35
End: 2020-09-01
Payer: COMMERCIAL

## 2020-09-01 VITALS
OXYGEN SATURATION: 98 % | BODY MASS INDEX: 30.43 KG/M2 | WEIGHT: 166.4 LBS | HEART RATE: 68 BPM | SYSTOLIC BLOOD PRESSURE: 132 MMHG | DIASTOLIC BLOOD PRESSURE: 84 MMHG | TEMPERATURE: 98.2 F

## 2020-09-01 PROCEDURE — 99395 PREV VISIT EST AGE 18-39: CPT | Performed by: FAMILY MEDICINE

## 2020-09-01 RX ORDER — IBUPROFEN 800 MG/1
800 TABLET ORAL EVERY 8 HOURS PRN
Qty: 90 TABLET | Refills: 0 | Status: SHIPPED | OUTPATIENT
Start: 2020-09-01 | End: 2021-12-20 | Stop reason: SDUPTHER

## 2020-09-01 ASSESSMENT — ENCOUNTER SYMPTOMS
COUGH: 0
SINUS PRESSURE: 0
PHOTOPHOBIA: 0
CONSTIPATION: 0
DIARRHEA: 0
COLOR CHANGE: 0
BLOOD IN STOOL: 0
ABDOMINAL PAIN: 0
ABDOMINAL DISTENTION: 0
VOMITING: 0
RHINORRHEA: 0
BACK PAIN: 0
CHEST TIGHTNESS: 0
VOICE CHANGE: 0
WHEEZING: 0
NAUSEA: 0
SHORTNESS OF BREATH: 0

## 2020-09-01 NOTE — PROGRESS NOTES
Value Date    LABA1C 5.3 05/27/2020         Lipid screening -lipid profile is     Lab Results   Component Value Date    CHOL 174 03/16/2019    CHOL 201 (H) 12/11/2017    CHOL 173 12/23/2016     Lab Results   Component Value Date    TRIG 108 03/16/2019    TRIG 241 (H) 12/11/2017    TRIG 77 12/23/2016     Lab Results   Component Value Date    HDL 40 (L) 05/30/2020    HDL 39 (L) 03/16/2019    HDL 38 (L) 12/11/2017     Lab Results   Component Value Date    LDLCHOLESTEROL 124 05/30/2020    LDLCHOLESTEROL 113 03/16/2019    LDLCHOLESTEROL 115 12/11/2017     Lab Results   Component Value Date    CHOLHDLRATIO 4.6 05/30/2020    CHOLHDLRATIO 4.5 03/16/2019    CHOLHDLRATIO 5.3 (H) 12/11/2017         The ASCVD Risk score (Eb Sanchez, et al., 2013) failed to calculate for the following reasons: The 2013 ASCVD risk score is only valid for ages 36 to 78    Hepatitis C screening-adults at high risk and adults born between [birth year]-46-  Lab Results   Component Value Date    HEPAIGM NONREACTIVE 03/20/2019    HEPBIGM NONREACTIVE 03/20/2019    HEPCAB NONREACTIVE 03/20/2019            []Negative depression screening. []1-4 = Minimal depression   []5-9 = Mild depression   []10-14 = Moderate depression   []15-19 = Moderately severe depression   []20-27 = Severe depression    PHQ Scores 5/27/2020 2/22/2019 6/12/2018 12/21/2016   PHQ2 Score 0 0 0 0   PHQ9 Score 0 0 0 0       Health Maintenance reviewed -.       Patient Active Problem List    Diagnosis Date Noted    Nonspecific reactive hepatitis 03/16/2019    Chronic fatigue 02/22/2019    Hyperlipidemia with target LDL less than 100 02/22/2019    Vitamin D deficiency 12/11/2017    Impaired fasting glucose 03/22/2017    Traumatic arthritis of ankle 03/22/2017    Hyperglycemia 12/23/2016    Obesity (BMI 30.0-34.9) 12/21/2016    Chronic pain of left ankle 12/21/2016    Unintended weight gain 12/21/2016         Past Medical History:   Diagnosis Date    Calcaneus fracture, left 04/2015    Chronic pain of left ankle 12/21/2016    Hyperlipidemia with target LDL less than 100 2/22/2019    Left ankle swelling 12/21/2016    Lipoma of torso right flank 6/13/2018    Low grade squamous intraepithelial lesion (LGSIL) on cervicovaginal cytologic smear 12/20/2016    Last PAP 6/1/16 in [address] is within normal limits     Low grade squamous intraepithelial lesion (LGSIL) on cervicovaginal cytologic smear 12/20/2016    Last PAP 6/1/16 in [address] is within normal limits     Navicular fracture, foot 04/2015    Non morbid obesity due to excess calories 12/21/2016    Nonspecific reactive hepatitis 3/16/2019    Obesity (BMI 30.0-34.9) 12/21/2016    Unintended weight gain 12/21/2016     Past Surgical History:   Procedure Laterality Date    FOOT SURGERY Left 04/2015    GALLBLADDER SURGERY  2010    [place] EXC SKIN BENIG <5MM TRUNK,ARM,LEG Right 7/17/2018    EXCISION LIPOMA ABDOMINAL WALL performed by Shikha Núñez DO at [address] [place] [address] OR     Family History   Problem Relation Age of Onset    Diabetes Mother     Diabetes Father     Colon Cancer Neg Hx     Breast Cancer Neg Hx      Social History     Tobacco Use    Smoking status: Never Smoker    Smokeless tobacco: Never Used   Substance Use Topics    Alcohol use: No    Drug use: No           Current Outpatient Medications   Medication Sig Dispense Refill    ibuprofen (ADVIL;MOTRIN) 800 MG tablet Take 1 tablet by mouth every 8 hours as needed for Pain (with food) 90 tablet 0    vitamin D (ERGOCALCIFEROL) 1.25 MG (44989 UT) CAPS capsule Take 1 capsule by mouth once a week 12 capsule 0    diclofenac sodium (VOLTAREN) 1 % GEL Apply 2 g topically every 6-8 hours as needed      VENTOLIN  (90 Base) MCG/ACT inhaler Inhale 1 puff into the lungs every 6-8 hours as needed      etonogestrel (NEXPLANON) 68 MG implant Inject 68 mg into the skin      Handicap Placard MISC by Does not apply route . Can't walk greater than 200 feet.  Expires in 5 years. 1 each 0    Cholecalciferol (VITAMIN D) 2000 units TABS tablet Take 1 tablet by mouth daily 30 tablet 3     No current facility-administered medications for this visit.              -rest of complaints with corresponding details per ROS    The patient's past medical, surgical, social, and family history as well as her current medications and allergies were reviewed as documented in today's encounter. Review of Systems   Constitutional: Negative for activity change, appetite change, diaphoresis, fatigue and unexpected weight change. HENT: Negative for congestion, ear pain, hearing loss, mouth sores, postnasal drip, rhinorrhea, sinus pressure, sneezing, tinnitus and voice change. Eyes: Negative for photophobia and visual disturbance. Respiratory: Negative for cough, chest tightness, shortness of breath and wheezing. Cardiovascular: Negative for chest pain, palpitations and leg swelling. Gastrointestinal: Negative for abdominal distention, abdominal pain, blood in stool, constipation, diarrhea, nausea and vomiting. Endocrine: Negative for polydipsia, polyphagia and polyuria. Genitourinary: Negative for difficulty urinating, flank pain, frequency, hematuria, urgency and vaginal pain. Musculoskeletal: Negative for arthralgias, back pain, joint swelling and neck pain. Skin: Negative for color change, rash and wound. Neurological: Negative for dizziness, tremors, speech difficulty, weakness, numbness and headaches. Psychiatric/Behavioral: Negative for agitation, confusion, decreased concentration, dysphoric mood, hallucinations, sleep disturbance and suicidal ideas.  The patient is not nervous/anxious.          -vital signs stable and within normal limits except   /84 (Site: Right Upper Arm, Position: Sitting, Cuff Size: Medium Adult)   Pulse 68   Temp 98.2 °F (36.8 °C)   Wt 166 lb 6.4 oz (75.5 kg)   SpO2 98%   BMI 30.43 kg/m²        Physical Exam  PHYSICAL EXAM:   VITALS: Vitals:    09/01/20 1524   BP: 132/84   Pulse: 68   Temp: 98.2 °F (36.8 °C)   SpO2: 98%     GENERAL:  Patient is a well-developed, well-nourished female  in no acute distress, alert and oriented x3, appropriate and pleasant conversation. HEAD: Normocephalic, atraumatic. EYES: Pupils equal, round and reactive to light and accommodation, extraocular   movements intact. ENT: Moist mucous membranes. No erythema is noted. NECK: Supple. No masses. No lymphadenopathy. CARDIOVASCULAR: Regular rate and rhythm. PULMONARY: Lungs are clear to auscultation bilaterally. ABDOMEN: Soft, nontender, nondistended. Positive bowel sounds. MUSCULOSKELETAL: Strength 5/5 bilaterally in all extremities. No tenderness to   palpation of the ribs, long bones, or spine. NEUROLOGIC: Cranial nerves II through XII grossly intact. No focal deficits are noted. I personally reviewed testing with patient.       Otherwise labs within normal limits      Lab Results   Component Value Date    WBC 5.5 05/30/2020    HGB 14.1 05/30/2020    HCT 41.9 05/30/2020    MCV 91.1 05/30/2020     05/30/2020       Lab Results   Component Value Date     05/30/2020    K 3.8 05/30/2020     05/30/2020    CO2 22 05/30/2020    BUN 11 05/30/2020    CREATININE 0.50 05/30/2020    GLUCOSE 102 03/16/2019    GLUCOSE 83 11/19/2011    CALCIUM 9.4 05/30/2020        Lab Results   Component Value Date    ALT 31 05/30/2020    AST 18 05/30/2020    ALKPHOS 79 05/30/2020    BILITOT 0.30 05/30/2020       Lab Results   Component Value Date    TSH 1.19 05/30/2020       Lab Results   Component Value Date    LDLCHOLESTEROL 124 05/30/2020       Lab Results   Component Value Date    LABA1C 5.3 05/27/2020       Lab Results   Component Value Date    YUVDGGPL82 414 02/14/2015       No results found for: FOLATE    Lab Results   Component Value Date    IRON 41 02/14/2015    TIBC 316 02/14/2015    FERRITIN 13 02/14/2015       Lab Results   Component Value Date VITD25 17.3 (L) 05/30/2020         ASSESSMENT AND PLAN      1. Annual physical exam  Normal physical.  Patient is up-to-date on health screening. Had Pap done last week. 2. Chronic pain of left ankle    - ibuprofen (ADVIL;MOTRIN) 800 MG tablet; Take 1 tablet by mouth every 8 hours as needed for Pain (with food)  Dispense: 90 tablet; Refill: 0        Low carb, low fat diet, increase fruits and vegetables, and exercise 4-5 times a week 30-40 minutes a day, or walk 1-2 hours per day, or wear a pedometer and get at least 10,000 steps per day. Dental exam 2-3 times /year advised. Immunizations reviewed. Health Maintenance reviewed - .  Smoking cessation counseling given       Get pap results from [address] [place] [address]. No orders of the defined types were placed in this encounter. Medications Discontinued During This Encounter   Medication Reason    ibuprofen (ADVIL;MOTRIN) 800 MG tablet Karen Petit received counseling on the following healthy behaviors: nutrition, exercise and medication adherence  Reviewed prior labs and health maintenance  Continue current medications, diet and exercise. Discussed use, benefit, and side effects of prescribed medications. Barriers to medication compliance addressed. Patient given educational materials - see patient instructions  Was a self-tracking handout given in paper form or via [employer]? Yes    Requested Prescriptions     Signed Prescriptions Disp Refills    ibuprofen (ADVIL;MOTRIN) 800 MG tablet 90 tablet 0     Sig: Take 1 tablet by mouth every 8 hours as needed for Pain (with food)       All patient questions answered. Patient voiced understanding. Quality Measures    Body mass index is 30.43 kg/m². Elevated. Weight control planned discussed Healthy diet and regular exercise. BP: 132/84 Blood pressure is normal. Treatment plan consists of No treatment change needed.     The patient's past medical, surgical, social, andfamily history as well as her current medications and allergies were reviewed as documented in today's encounter. Medications, labs, diagnostic studies, consultations and follow-up as documented in thisencounter. No follow-ups on file. Patient was seen with total face to face timeof 30 minutes. More than 50% of this visit was counseling and education. No future appointments. This note was completed by using the assistance of a speech-recognition program. However, inadvertent computerized transcription errors may be present. Although every effort was made to ensure accuracy, no guarantees can be provided that every mistake has been identified and corrected by editing.     Electronically signed by Florentino Noe MD on 9/1/2020 at 10:11 PM

## 2020-09-01 NOTE — PROGRESS NOTES
Visit Information    Have you changed or started any medications since your last visit including any over-the-counter medicines, vitamins, or herbal medicines? no   Are you having any side effects from any of your medications? -  no  Have you stopped taking any of your medications? Is so, why? -  no    Have you seen any other physician or provider since your last visit? Yes - Records Obtained  Have you had any other diagnostic tests since your last visit? Yes - Records Obtained  Have you been seen in the emergency room and/or had an admission to a hospital since we last saw you? No  Have you had your routine dental cleaning in the past 6 months? yes -     Have you activated your [employer] account? If not, what are your barriers?  Yes     Patient Care Team:  Polly Richardson MD as PCP - General (Family Medicine)  Polly Richardson MD as PCP - [facility] Provider  Jose Kat as Obstetrician (Certified Nurse Midwife)  Valerie Lawson MD as Surgeon (Orthopedic Surgery)    Medical History Review  Past Medical, Family, and Social History reviewed and does contribute to the patient presenting condition    Health Maintenance   Topic Date Due    Cervical cancer screen  06/01/2019    Flu vaccine (1) 09/01/2020    A1C test (Diabetic or Prediabetic)  05/27/2021    DTaP/Tdap/Td vaccine (3 - Td) 05/25/2030    HIV screen  Completed    Hepatitis A vaccine  Aged Out    Hepatitis B vaccine  Aged Out    Hib vaccine  Aged Out    Meningococcal (ACWY) vaccine  Aged Out    Pneumococcal 0-64 years Vaccine  Aged Out    Varicella vaccine  Discontinued

## 2021-03-18 ENCOUNTER — OFFICE VISIT (OUTPATIENT)
Dept: FAMILY MEDICINE CLINIC | Age: 36
End: 2021-03-18
Payer: COMMERCIAL

## 2021-03-18 VITALS
OXYGEN SATURATION: 98 % | HEART RATE: 85 BPM | HEIGHT: 62 IN | DIASTOLIC BLOOD PRESSURE: 80 MMHG | SYSTOLIC BLOOD PRESSURE: 110 MMHG | BODY MASS INDEX: 32.57 KG/M2 | TEMPERATURE: 98.3 F | WEIGHT: 177 LBS

## 2021-03-18 DIAGNOSIS — E66.09 CLASS 1 OBESITY DUE TO EXCESS CALORIES WITH SERIOUS COMORBIDITY AND BODY MASS INDEX (BMI) OF 32.0 TO 32.9 IN ADULT: ICD-10-CM

## 2021-03-18 DIAGNOSIS — Z00.00 ANNUAL PHYSICAL EXAM: Primary | ICD-10-CM

## 2021-03-18 DIAGNOSIS — E78.5 HYPERLIPIDEMIA WITH TARGET LDL LESS THAN 100: ICD-10-CM

## 2021-03-18 DIAGNOSIS — E55.9 VITAMIN D DEFICIENCY: ICD-10-CM

## 2021-03-18 PROBLEM — E66.811 CLASS 1 OBESITY DUE TO EXCESS CALORIES WITH SERIOUS COMORBIDITY AND BODY MASS INDEX (BMI) OF 32.0 TO 32.9 IN ADULT: Status: ACTIVE | Noted: 2021-03-18

## 2021-03-18 PROCEDURE — 99395 PREV VISIT EST AGE 18-39: CPT | Performed by: FAMILY MEDICINE

## 2021-03-18 RX ORDER — ERGOCALCIFEROL 1.25 MG/1
50000 CAPSULE ORAL WEEKLY
Qty: 12 CAPSULE | Refills: 0 | Status: SHIPPED | OUTPATIENT
Start: 2021-03-18 | End: 2021-04-23 | Stop reason: SDUPTHER

## 2021-03-18 ASSESSMENT — ENCOUNTER SYMPTOMS
ABDOMINAL DISTENTION: 0
BLOOD IN STOOL: 0
RECTAL PAIN: 0
COUGH: 0
NAUSEA: 0
SINUS PRESSURE: 0
COLOR CHANGE: 0
SINUS PAIN: 0
DIARRHEA: 0
BACK PAIN: 0
CONSTIPATION: 0
CHEST TIGHTNESS: 0
SHORTNESS OF BREATH: 0
VOMITING: 0

## 2021-03-18 ASSESSMENT — PATIENT HEALTH QUESTIONNAIRE - PHQ9
SUM OF ALL RESPONSES TO PHQ QUESTIONS 1-9: 0
SUM OF ALL RESPONSES TO PHQ9 QUESTIONS 1 & 2: 0
SUM OF ALL RESPONSES TO PHQ QUESTIONS 1-9: 0
SUM OF ALL RESPONSES TO PHQ QUESTIONS 1-9: 0

## 2021-03-18 NOTE — PROGRESS NOTES
Visit Information    Have you changed or started any medications since your last visit including any over-the-counter medicines, vitamins, or herbal medicines? no   Are you having any side effects from any of your medications? -  no  Have you stopped taking any of your medications? Is so, why? -  no    Have you seen any other physician or provider since your last visit? No  Have you had any other diagnostic tests since your last visit? No  Have you been seen in the emergency room and/or had an admission to a hospital since we last saw you? No  Have you had your routine dental cleaning in the past 6 months? yes -     Have you activated your [employer] account? If not, what are your barriers?  Yes     Patient Care Team:  Neeta Harris MD as PCP - General (Family Medicine)  [facility]  Jada Wray as Obstetrician (Certified Nurse Midwife)  Francia Ross MD as Surgeon (Orthopedic Surgery)    Medical History Review  Past Medical, Family, and Social History reviewed and does contribute to the patient presenting condition    Health Maintenance   Topic Date Due    Flu vaccine (1) 06/30/2021 (Originally 9/1/2020)    A1C test (Diabetic or Prediabetic)  05/27/2021    Cervical cancer screen  08/26/2023    DTaP/Tdap/Td vaccine (3 - Td) 05/25/2030    Hepatitis C screen  Completed    HIV screen  Completed    Hepatitis A vaccine  Aged Out    Hepatitis B vaccine  Aged Out    Hib vaccine  Aged Out    Meningococcal (ACWY) vaccine  Aged Out    Pneumococcal 0-64 years Vaccine  Aged Out    Varicella vaccine  Discontinued
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
## 2021-04-23 DIAGNOSIS — E55.9 VITAMIN D DEFICIENCY: ICD-10-CM

## 2021-04-23 RX ORDER — ERGOCALCIFEROL 1.25 MG/1
50000 CAPSULE ORAL WEEKLY
Qty: 12 CAPSULE | Refills: 0 | Status: SHIPPED | OUTPATIENT
Start: 2021-04-23 | End: 2021-05-04 | Stop reason: SDUPTHER

## 2021-04-23 NOTE — TELEPHONE ENCOUNTER
Please Approve or Refuse.   Send to Pharmacy per Pt's Request:     Next Visit Date:  Visit date not found   Last Visit Date: 3/18/2021    Hemoglobin A1C (%)   Date Value   05/27/2020 5.3   02/22/2019 4.8   06/12/2018 5.2             ( goal A1C is < 7)   BP Readings from Last 3 Encounters:   03/18/21 110/80   09/01/20 132/84   05/27/20 124/86          (goal 120/80)  BUN   Date Value Ref Range Status   05/30/2020 11 6 - 20 mg/dL Final     CREATININE   Date Value Ref Range Status   05/30/2020 0.50 0.50 - 0.90 mg/dL Final     Potassium   Date Value Ref Range Status   05/30/2020 3.8 3.7 - 5.3 mmol/L Final

## 2021-05-04 DIAGNOSIS — E55.9 VITAMIN D DEFICIENCY: ICD-10-CM

## 2021-05-04 RX ORDER — ERGOCALCIFEROL 1.25 MG/1
50000 CAPSULE ORAL WEEKLY
Qty: 12 CAPSULE | Refills: 0 | Status: SHIPPED | OUTPATIENT
Start: 2021-05-04 | End: 2022-06-11 | Stop reason: ALTCHOICE

## 2021-12-20 ENCOUNTER — OFFICE VISIT (OUTPATIENT)
Dept: FAMILY MEDICINE CLINIC | Age: 36
End: 2021-12-20
Payer: COMMERCIAL

## 2021-12-20 VITALS
HEIGHT: 62 IN | HEART RATE: 80 BPM | WEIGHT: 176 LBS | SYSTOLIC BLOOD PRESSURE: 110 MMHG | TEMPERATURE: 97.6 F | BODY MASS INDEX: 32.39 KG/M2 | OXYGEN SATURATION: 98 % | DIASTOLIC BLOOD PRESSURE: 80 MMHG

## 2021-12-20 DIAGNOSIS — M79.672 LEFT FOOT PAIN: Primary | ICD-10-CM

## 2021-12-20 DIAGNOSIS — M25.572 CHRONIC PAIN OF LEFT ANKLE: ICD-10-CM

## 2021-12-20 DIAGNOSIS — M77.9 TENDONITIS: ICD-10-CM

## 2021-12-20 DIAGNOSIS — Z87.81 H/O FRACTURE OF ANKLE: ICD-10-CM

## 2021-12-20 DIAGNOSIS — G89.29 CHRONIC PAIN OF LEFT ANKLE: ICD-10-CM

## 2021-12-20 PROCEDURE — G8484 FLU IMMUNIZE NO ADMIN: HCPCS | Performed by: FAMILY MEDICINE

## 2021-12-20 PROCEDURE — G8417 CALC BMI ABV UP PARAM F/U: HCPCS | Performed by: FAMILY MEDICINE

## 2021-12-20 PROCEDURE — 1036F TOBACCO NON-USER: CPT | Performed by: FAMILY MEDICINE

## 2021-12-20 PROCEDURE — 99213 OFFICE O/P EST LOW 20 MIN: CPT | Performed by: FAMILY MEDICINE

## 2021-12-20 PROCEDURE — G8427 DOCREV CUR MEDS BY ELIG CLIN: HCPCS | Performed by: FAMILY MEDICINE

## 2021-12-20 RX ORDER — IBUPROFEN 800 MG/1
800 TABLET ORAL EVERY 8 HOURS PRN
Qty: 90 TABLET | Refills: 0 | Status: SHIPPED | OUTPATIENT
Start: 2021-12-20 | End: 2022-08-26 | Stop reason: SDUPTHER

## 2021-12-20 RX ORDER — LIDOCAINE 40 MG/G
CREAM TOPICAL
Qty: 45 G | Refills: 3 | Status: SHIPPED | OUTPATIENT
Start: 2021-12-20

## 2021-12-20 ASSESSMENT — PATIENT HEALTH QUESTIONNAIRE - PHQ9
SUM OF ALL RESPONSES TO PHQ QUESTIONS 1-9: 0
2. FEELING DOWN, DEPRESSED OR HOPELESS: 0
SUM OF ALL RESPONSES TO PHQ9 QUESTIONS 1 & 2: 0
SUM OF ALL RESPONSES TO PHQ QUESTIONS 1-9: 0
1. LITTLE INTEREST OR PLEASURE IN DOING THINGS: 0
SUM OF ALL RESPONSES TO PHQ QUESTIONS 1-9: 0

## 2021-12-20 ASSESSMENT — ENCOUNTER SYMPTOMS
BACK PAIN: 0
WHEEZING: 0
BLOOD IN STOOL: 0
SHORTNESS OF BREATH: 0
ABDOMINAL DISTENTION: 0
CHEST TIGHTNESS: 0
COUGH: 0
CONSTIPATION: 0

## 2021-12-20 NOTE — PROGRESS NOTES
Visit Information    Have you changed or started any medications since your last visit including any over-the-counter medicines, vitamins, or herbal medicines? no   Are you having any side effects from any of your medications? -  no  Have you stopped taking any of your medications? Is so, why? -  no    Have you seen any other physician or provider since your last visit? No  Have you had any other diagnostic tests since your last visit? No  Have you been seen in the emergency room and/or had an admission to a hospital since we last saw you? No  Have you had your routine dental cleaning in the past 6 months? no    Have you activated your [employer] account? If not, what are your barriers?  Yes     Patient Care Team:  Harman Slaughter MD as PCP - General (Family Medicine)  Harman Slaughter MD as PCP - [facility] Provider  Charlee Atwood as Obstetrician (Certified Nurse Midwife)  Jude Hightower MD as Surgeon (Orthopedic Surgery)    Medical History Review  Past Medical, Family, and Social History reviewed and does contribute to the patient presenting condition    Health Maintenance   Topic Date Due    COVID-19 Vaccine (1) Never done    A1C test (Diabetic or Prediabetic)  05/27/2021    Flu vaccine (1) Never done    Cervical cancer screen  08/26/2023    DTaP/Tdap/Td vaccine (3 - Td or Tdap) 05/25/2030    Hepatitis C screen  Completed    HIV screen  Completed    Hepatitis A vaccine  Aged Out    Hepatitis B vaccine  Aged Out    Hib vaccine  Aged Out    Meningococcal (ACWY) vaccine  Aged Out    Pneumococcal 0-64 years Vaccine  Aged Out    Varicella vaccine  Discontinued

## 2021-12-20 NOTE — PATIENT INSTRUCTIONS
Patient Education        Posterior Tibial Tendinitis: Exercises  Introduction  Here are some examples of exercises for you to try. The exercises may be suggested for a condition or for rehabilitation. Start each exercise slowly. Ease off the exercises if you start to have pain. You will be told when to start these exercises and which ones will work best for you. How to do the exercises  Calf wall stretch (back knee straight)    1. Stand facing a wall with your hands on the wall at about eye level. Put your affected leg about a step behind your other leg. 2. Keeping your back leg straight and your back heel on the floor, bend your front knee and gently bring your hip and chest toward the wall until you feel a stretch in the calf of your back leg. 3. Hold the stretch for at least 15 to 30 seconds. 4. Repeat 2 to 4 times. Calf wall stretch (knees bent)    1. Stand facing a wall with your hands on the wall at about eye level. Put your affected leg about a step behind your other leg. 2. Keeping both heels on the floor, bend both knees. Then gently bring your hip and chest toward the wall until you feel a stretch in the calf of your back leg. 3. Hold the stretch for at least 15 to 30 seconds. 4. Repeat 2 to 4 times. Hamstring wall stretch    1. Lie on your back in a doorway, with your good leg through the open door. 2. Slide your affected leg up the wall to straighten your knee. You should feel a gentle stretch down the back of your leg. 3. Hold the stretch for at least 1 minute to begin. Then over time, try to lengthen the time you hold the stretch to as long as 6 minutes. 4. Repeat 2 to 4 times. 5. If you do not have a place to do this exercise in a doorway, there is another way to do it:  6. Lie on your back, and bend the knee of your affected leg. 7. Loop a towel under the ball and toes of that foot, and hold the ends of the towel in your hands.   8. Straighten your knee, and slowly pull back on the towel. You should feel a gentle stretch down the back of your leg. 9. Hold the stretch for 15 to 30 seconds. Or even better, hold the stretch for 1 minute if you can. 10. Repeat 2 to 4 times. 1. Do not arch your back. 2. Do not bend either knee. 3. Keep one heel touching the floor and the other heel touching the wall. Do not point your toes. Shin muscle stretch    1. Sit in a chair, with both feet flat on the floor. 2. Bend your affected leg behind you so that the top of your foot near your toes is flat on the floor and your toes are pointed away from your body. If you need to, you can hold on to the sides of the chair for support. 3. Hold the stretch for at least 15 to 30 seconds. You should feel a stretch in the front (shin) of your lower leg. 4. Repeat 2 to 4 times. Follow-up care is a key part of your treatment and safety. Be sure to make and go to all appointments, and call your doctor if you are having problems. It's also a good idea to know your test results and keep a list of the medicines you take. Where can you learn more? Go to https://[employer]. org and sign in to your [employer] account. Enter G096 in the [facility] box to learn more about \"Posterior Tibial Tendinitis: Exercises. \"     If you do not have an account, please click on the \"Sign Up Now\" link. Current as of: July 1, 2021               Content Version: 13.0  © 2006-2021 [employer]. Care instructions adapted under license by [facility] [place] ([facility]). If you have questions about a medical condition or this instruction, always ask your healthcare professional. [place] [address] any warranty or liability for your use of this information.

## 2021-12-20 NOTE — PROGRESS NOTES
Chief Complaint   Patient presents with    Foot Pain     left          [place] Scale  here today for follow up on chronic medical problems, go over labs and/or diagnostic studies, and medication refills. Foot Pain (left )      HPI: Patient is complaining for sick call complaining of  Left foot pain started 2 to 3 weeks on and off. Patient has history of left foot fracture and had screws placed. Patient reports she has started a new job and [employer] and is on her feet for the whole day. Patient reported she tried ibuprofen that gives mild relief. She denies any swelling any redness or any trauma. She had x-rays done in 2018 which showed chronic changes. Patient had blood work ordered in March she has not done that. /80   Pulse 80   Temp 97.6 °F (36.4 °C)   Ht 5' 2\" (1.575 m)   Wt 176 lb (79.8 kg)   LMP 11/20/2021 (Approximate)   SpO2 98%   BMI 32.19 kg/m²    Body mass index is 32.19 kg/m². Wt Readings from Last 3 Encounters:   12/20/21 176 lb (79.8 kg)   03/18/21 177 lb (80.3 kg)   09/01/20 166 lb 6.4 oz (75.5 kg)        [x]Negative depression screening. PHQ Scores 12/20/2021 3/18/2021 5/27/2020 2/22/2019 6/12/2018 12/21/2016   PHQ2 Score 0 0 0 0 0 0   PHQ9 Score 0 0 0 0 0 0      []1-4 = Minimal depression   []5-9 = Milddepression   []10-14 = Moderate depression   []15-19 = Moderately severe depression   []20-27 = Severe depression    Discussed testing with the patient and all questions fully answered.     Hospital Outpatient Visit on 05/30/2020   Component Date Value Ref Range Status    Color, UA 05/30/2020 YELLOW  YELLOW Final    Turbidity UA 05/30/2020 CLEAR  CLEAR Final    Glucose, Ur 05/30/2020 NEGATIVE  NEGATIVE Final    Bilirubin Urine 05/30/2020 NEGATIVE  NEGATIVE Final    Ketones, Urine 05/30/2020 NEGATIVE  NEGATIVE Final    Specific Gravity, UA 05/30/2020 1.025  1.000 - 1.030 Final    Urine Hgb 05/30/2020 SMALL* NEGATIVE Final    pH, UA 05/30/2020 6.0  5.0 - 8.0 Final  Protein, UA 05/30/2020 NEGATIVE  NEGATIVE Final    Urobilinogen, Urine 05/30/2020 Normal  Normal Final    Nitrite, Urine 05/30/2020 NEGATIVE  NEGATIVE Final    Leukocyte Esterase, Urine 05/30/2020 SMALL* NEGATIVE Final    Urinalysis Comments 05/30/2020 NOT REPORTED   Final    WBC 05/30/2020 5.5  3.5 - 11.0 k/uL Final    RBC 05/30/2020 4.60  4.0 - 5.2 m/uL Final    Hemoglobin 05/30/2020 14.1  12.0 - 16.0 g/dL Final    Hematocrit 05/30/2020 41.9  36 - 46 % Final    MCV 05/30/2020 91.1  80 - 100 fL Final    MCH 05/30/2020 30.6  26 - 34 pg Final    MCHC 05/30/2020 33.6  31 - 37 g/dL Final    RDW 05/30/2020 13.3  11.5 - 14.9 % Final    Platelets 07/79/7448 252  150 - 450 k/uL Final    MPV 05/30/2020 9.3  6.0 - 12.0 fL Final    NRBC Automated 05/30/2020 NOT REPORTED  per 100 WBC Final    Differential Type 05/30/2020 NOT REPORTED   Final    Seg Neutrophils 05/30/2020 65  36 - 66 % Final    Lymphocytes 05/30/2020 27  24 - 44 % Final    Monocytes 05/30/2020 6  1 - 7 % Final    Eosinophils % 05/30/2020 1  0 - 4 % Final    Basophils 05/30/2020 1  0 - 2 % Final    Immature Granulocytes 05/30/2020 NOT REPORTED  0 % Final    Segs Absolute 05/30/2020 3.60  1.3 - 9.1 k/uL Final    Absolute Lymph # 05/30/2020 1.50  1.0 - 4.8 k/uL Final    Absolute Mono # 05/30/2020 0.30  0.1 - 1.3 k/uL Final    Absolute Eos # 05/30/2020 0.10  0.0 - 0.4 k/uL Final    Basophils Absolute 05/30/2020 0.00  0.0 - 0.2 k/uL Final    Absolute Immature Granulocyte 05/30/2020 NOT REPORTED  0.00 - 0.30 k/uL Final    WBC Morphology 05/30/2020 NOT REPORTED   Final    RBC Morphology 05/30/2020 NOT REPORTED   Final    Platelet Estimate 71/15/3352 NOT REPORTED   Final    Glucose, Fasting 05/30/2020 102* 70 - 99 mg/dL Final    BUN 05/30/2020 11  6 - 20 mg/dL Final    CREATININE 05/30/2020 0.50  0.50 - 0.90 mg/dL Final    Bun/Cre Ratio 05/30/2020 NOT REPORTED  9 - 20 Final    Calcium 05/30/2020 9.4  8.6 - 10.4 mg/dL Final    Sodium 05/30/2020 138  135 - 144 mmol/L Final    Potassium 05/30/2020 3.8  3.7 - 5.3 mmol/L Final    Chloride 05/30/2020 104  98 - 107 mmol/L Final    CO2 05/30/2020 22  20 - 31 mmol/L Final    Anion Gap 05/30/2020 12  9 - 17 mmol/L Final    Alkaline Phosphatase 05/30/2020 79  35 - 104 U/L Final    ALT 05/30/2020 31  5 - 33 U/L Final    AST 05/30/2020 18  <32 U/L Final    Total Bilirubin 05/30/2020 0.30  0.3 - 1.2 mg/dL Final    Total Protein 05/30/2020 7.7  6.4 - 8.3 g/dL Final    Albumin 05/30/2020 4.3  3.5 - 5.2 g/dL Final    Albumin/Globulin Ratio 05/30/2020 NOT REPORTED  1.0 - 2.5 Final    GFR Non- 05/30/2020 >60  >60 mL/min Final    GFR  05/30/2020 >60  >60 mL/min Final    GFR Comment 05/30/2020        Final    Comment: Average GFR for 30-36 years old:   80 mL/min/1.73sq m  Chronic Kidney Disease:   <60 mL/min/1.73sq m  Kidney failure:   <15 mL/min/1.73sq m              eGFR calculated using average adult body mass.  Additional eGFR calculator available at:        [email or web address]            GFR Staging 05/30/2020 NOT REPORTED   Final    VARICELLA ZOSTER AB IGG 05/30/2020 2.19  >1.09 Final    Comment:    Interpretation:  IMMUNE     Reference Range:  <0.91         Not Immune  0.91-1.09     Equivocal  >1.09         Immune         Vit D, 25-Hydroxy 05/30/2020 17.3* 30.0 - 100.0 ng/mL Final    Comment:    Reference Range:  Vitamin D status         Range   Deficiency              <20 ng/mL   Mild Deficiency       20-30 ng/mL   Sufficiency           ng/mL   Toxicity               >100 ng/mL      TSH 05/30/2020 1.19  0.30 - 5.00 mIU/L Final    Cholesterol, Fasting 05/30/2020 184  <200 mg/dL Final    Comment:    Cholesterol Guidelines:      <200  Desirable   200-240  Borderline      >240  Undesirable         HDL 05/30/2020 40* >40 mg/dL Final    Comment:    HDL Guidelines:    <40     Undesirable   40-59    Borderline    >59 Desirable         LDL Cholesterol 05/30/2020 124  0 - 130 mg/dL Final    Comment:    LDL Guidelines:     <100    Desirable   100-129   Near to/above Desirable   130-159   Borderline      >159   Undesirable     Direct (measured) LDL and calculated LDL are not interchangeable tests.  Chol/HDL Ratio 05/30/2020 4.6  <5 Final            Triglyceride, Fasting 05/30/2020 101  <150 mg/dL Final    Comment:    Triglyceride Guidelines:     <150   Desirable   150-199  Borderline   200-499  High     >499   Very high   Based on AHA Guidelines for fasting triglyceride, October 2012.  VLDL 05/30/2020 NOT REPORTED  1 - 30 mg/dL Final    - 05/30/2020        Final    WBC, UA 05/30/2020 2 TO 5  /HPF Final    RBC, UA 05/30/2020 2 TO 5  /HPF Final    Casts UA 05/30/2020 NOT REPORTED  /LPF Final    Crystals, UA 05/30/2020 NOT REPORTED  None /HPF Final    Epithelial Cells UA 05/30/2020 10 TO 20  /HPF Final    Renal Epithelial, UA 05/30/2020 NOT REPORTED  0 /HPF Final    Bacteria, UA 05/30/2020 FEW* None Final    Mucus, UA 05/30/2020 1+* None Final    Trichomonas, UA 05/30/2020 NOT REPORTED  None Final    Amorphous, UA 05/30/2020 NOT REPORTED  None Final    Other Observations UA 05/30/2020 NOT REPORTED  NOT REQ. Final    Yeast, UA 05/30/2020 NOT REPORTED  None Final    Specimen Description 05/30/2020 . CLEAN CATCH URINE   Final    Special Requests 05/30/2020 NOT REPORTED   Final    Culture 05/30/2020 NO GROWTH   Final         Most recent labs reviewed:     Lab Results   Component Value Date    WBC 5.5 05/30/2020    HGB 14.1 05/30/2020    HCT 41.9 05/30/2020    MCV 91.1 05/30/2020     05/30/2020       @BRIEFLAB(NA,K,CL,CO2,BUN,CREATININE,GLUCOSE,CALCIUM)@     Lab Results   Component Value Date    ALT 31 05/30/2020    AST 18 05/30/2020    ALKPHOS 79 05/30/2020    BILITOT 0.30 05/30/2020       Lab Results   Component Value Date    TSH 1.19 05/30/2020       Lab Results   Component Value Date    CHOL 174 03/16/2019    CHOL 201 (H) 12/11/2017    CHOL 173 12/23/2016     Lab Results   Component Value Date    TRIG 108 03/16/2019    TRIG 241 (H) 12/11/2017    TRIG 77 12/23/2016     Lab Results   Component Value Date    HDL 40 (L) 05/30/2020    HDL 39 (L) 03/16/2019    HDL 38 (L) 12/11/2017     Lab Results   Component Value Date    LDLCHOLESTEROL 124 05/30/2020    LDLCHOLESTEROL 113 03/16/2019    LDLCHOLESTEROL 115 12/11/2017     Lab Results   Component Value Date    VLDL NOT REPORTED 05/30/2020    VLDL NOT REPORTED 03/16/2019    VLDL NOT REPORTED 12/11/2017     Lab Results   Component Value Date    CHOLHDLRATIO 4.6 05/30/2020    CHOLHDLRATIO 4.5 03/16/2019    CHOLHDLRATIO 5.3 (H) 12/11/2017       Lab Results   Component Value Date    LABA1C 5.3 05/27/2020       Lab Results   Component Value Date    TZINFWQN77 414 02/14/2015       No results found for: FOLATE    Lab Results   Component Value Date    IRON 41 02/14/2015    TIBC 316 02/14/2015    FERRITIN 13 02/14/2015       Lab Results   Component Value Date    VITD25 17.3 (L) 05/30/2020             Current Outpatient Medications   Medication Sig Dispense Refill    ibuprofen (ADVIL;MOTRIN) 800 MG tablet Take 1 tablet by mouth every 8 hours as needed for Pain (with food) 90 tablet 0    lidocaine (LMX) 4 % cream Apply topically every 8 hrs as needed for pain 45 g 3    diclofenac sodium (VOLTAREN) 1 % GEL Apply 2 g topically every 6-8 hours as needed      etonogestrel (NEXPLANON) 68 MG implant Inject 68 mg into the skin      Handicap Placard MISC by Does not apply route . Can't walk greater than 200 feet. Expires in 5 years. 1 each 0    vitamin D (ERGOCALCIFEROL) 1.25 MG (33570 UT) CAPS capsule Take 1 capsule by mouth once a week 12 capsule 0     No current facility-administered medications for this visit.              Social History     Socioeconomic History    Marital status: Single     Spouse name: Not on file    Number of children: Not on file    Years of education: Not on file    Highest education level: Not on file   Occupational History    Not on file   Tobacco Use    Smoking status: Never Smoker    Smokeless tobacco: Never Used   Vaping Use    Vaping Use: Never used   Substance and Sexual Activity    Alcohol use: No    Drug use: No    Sexual activity: Not on file   Other Topics Concern    Not on file   Social History Narrative    Not on file     Social Determinants of Health     Financial Resource Strain:     Difficulty of Paying Living Expenses: Not on file   Food Insecurity:     Worried About Running Out of Food in the Last Year: Not on file    Walter of Food in the Last Year: Not on file   Transportation Needs:     Lack of Transportation (Medical): Not on file    Lack of Transportation (Non-Medical):  Not on file   Physical Activity:     Days of Exercise per Week: Not on file    Minutes of Exercise per Session: Not on file   Stress:     Feeling of Stress : Not on file   Social Connections:     Frequency of Communication with Friends and Family: Not on file    Frequency of Social Gatherings with Friends and Family: Not on file    Attends Evangelical Services: Not on file    Active Member of [address] [place] [address] or Organizations: Not on file    Attends Club or Organization Meetings: Not on file    Marital Status: Not on file   Intimate Partner Violence:     Fear of Current or Ex-Partner: Not on file    Emotionally Abused: Not on file    Physically Abused: Not on file    Sexually Abused: Not on file   Housing Stability:     Unable to Pay for Housing in the Last Year: Not on file    Number of [place] in the Last Year: Not on file    Unstable Housing in the Last Year: Not on file     Counseling given: Not Answered        Family History   Problem Relation Age of Onset    Diabetes Mother     Diabetes Father     Colon Cancer Neg Hx     Breast Cancer Neg Hx              -rest of complaints with corresponding details per ROS    The patient's past medical, surgical, social, and family history as well as her current medications and allergies were reviewed as documented intoday's encounter. Review of Systems   Constitutional: Negative for activity change, appetite change, fatigue and unexpected weight change. Eyes: Negative for visual disturbance. Respiratory: Negative for cough, chest tightness, shortness of breath and wheezing. Cardiovascular: Negative for chest pain and leg swelling. Gastrointestinal: Negative for abdominal distention, blood in stool and constipation. Genitourinary: Negative for difficulty urinating, frequency and urgency. Musculoskeletal: Positive for arthralgias. Negative for back pain, myalgias and neck pain. Ankle pain   Neurological: Negative for speech difficulty, weakness, numbness and headaches. Psychiatric/Behavioral: Negative for agitation and behavioral problems. Physical Exam  Vitals and nursing note reviewed. Constitutional:       Appearance: Normal appearance. HENT:      Mouth/Throat:      Mouth: Mucous membranes are moist.   Cardiovascular:      Rate and Rhythm: Normal rate and regular rhythm. Heart sounds: Normal heart sounds. Pulmonary:      Effort: Pulmonary effort is normal.      Breath sounds: Normal breath sounds. Musculoskeletal:      Cervical back: Normal range of motion. Right ankle:      Right Achilles Tendon: Normal.      Left ankle: Deformity present. No swelling or ecchymosis. Tenderness present over the lateral malleolus. No medial malleolus or ATF ligament tenderness. Normal range of motion. Left Achilles Tendon: Normal.   Neurological:      Mental Status: She is alert and oriented to person, place, and time. Psychiatric:         Attention and Perception: Attention and perception normal.         Mood and Affect: Mood is not anxious or depressed. ASSESSMENT AND PLAN      1.  Left foot pain  Symptomatic treatment  - ibuprofen (ADVIL;MOTRIN) 800 MG tablet; Take 1 tablet by mouth every 8 hours as needed for Pain (with food)  Dispense: 90 tablet; Refill: 0  - lidocaine (LMX) 4 % cream; Apply topically every 8 hrs as needed for pain  Dispense: 45 g; Refill: 3    2. H/O fracture of ankle  Stable on previous x-rays    3. Chronic pain of left ankle  Continue NSAIDs as needed  - ibuprofen (ADVIL;MOTRIN) 800 MG tablet; Take 1 tablet by mouth every 8 hours as needed for Pain (with food)  Dispense: 90 tablet; Refill: 0    4. Tendonitis  Take NSAIDs we will hold on x-rays. - ibuprofen (ADVIL;MOTRIN) 800 MG tablet; Take 1 tablet by mouth every 8 hours as needed for Pain (with food)  Dispense: 90 tablet; Refill: 0  - lidocaine (LMX) 4 % cream; Apply topically every 8 hrs as needed for pain  Dispense: 45 g; Refill: 3      No orders of the defined types were placed in this encounter. Medications Discontinued During This Encounter   Medication Reason    ibuprofen (ADVIL;MOTRIN) 800 MG tablet [employer] received counseling on the following healthy behaviors: nutrition, exercise and medication adherence  Reviewed prior labs and health maintenance  Continue current medications, diet and exercise. Discussed use, benefit, and side effects of prescribed medications. Barriers to medication compliance addressed. Patient given educational materials - see patient instructions  Was a self-tracking handout given in paper form or via [employer]? Yes    Requested Prescriptions     Signed Prescriptions Disp Refills    ibuprofen (ADVIL;MOTRIN) 800 MG tablet 90 tablet 0     Sig: Take 1 tablet by mouth every 8 hours as needed for Pain (with food)    lidocaine (LMX) 4 % cream 45 g 3     Sig: Apply topically every 8 hrs as needed for pain       All patient questions answered. Patient voiced understanding. Quality Measures    Body mass index is 32.19 kg/m². Elevated.  Weight control planned discussed daily exercise regimen and Healthy diet and regular exercise. BP: 110/80 Blood pressure is normal. Treatment plan consists of No treatment change needed. Lab Results   Component Value Date    LDLCHOLESTEROL 124 05/30/2020    (goal LDL reduction with dx if diabetes is 50% LDL reduction)      PHQ Scores 12/20/2021 3/18/2021 5/27/2020 2/22/2019 6/12/2018 12/21/2016   PHQ2 Score 0 0 0 0 0 0   PHQ9 Score 0 0 0 0 0 0     Interpretation of Total Score Depression Severity: 1-4 = Minimal depression, 5-9 = Mild depression, 10-14 = Moderate depression, 15-19 = Moderately severe depression, 20-27 = Severe depression    The patient'spast medical, surgical, social, and family history as well as her   current medications and allergies were reviewed as documented in today's encounter. Medications, labs, diagnostic studies, consultations andfollow-up as documented in this encounter. Return in about 3 months (around 3/20/2022) for annual exam 30min. Patient wasseen with total face to face time of 20 minutes. More than 50% of this visit was counseling and education. Future Appointments   Date Time Provider Verna Diop   6/1/2022 11:30 AM [facility]     This note was completed by using the assistance of a speech-recognition program. However, inadvertent computerized transcription errors may be present. Althoughevery effort was made to ensure accuracy, no guarantees can be provided that every mistake has been identified and corrected by editing.   Electronically signed by Elie Espana MD on 12/20/2021  1:09 PM

## 2022-01-11 ENCOUNTER — TELEPHONE (OUTPATIENT)
Dept: FAMILY MEDICINE CLINIC | Age: 37
End: 2022-01-11

## 2022-01-11 ENCOUNTER — E-VISIT (OUTPATIENT)
Dept: FAMILY MEDICINE CLINIC | Age: 37
End: 2022-01-11
Payer: COMMERCIAL

## 2022-01-11 DIAGNOSIS — U07.1 COVID-19 VIRUS INFECTION: Primary | ICD-10-CM

## 2022-01-11 PROCEDURE — 99422 OL DIG E/M SVC 11-20 MIN: CPT | Performed by: FAMILY MEDICINE

## 2022-01-11 RX ORDER — BENZONATATE 100 MG/1
100 CAPSULE ORAL 3 TIMES DAILY PRN
Qty: 21 CAPSULE | Refills: 0 | Status: SHIPPED | OUTPATIENT
Start: 2022-01-11 | End: 2022-01-18

## 2022-01-11 RX ORDER — DEXAMETHASONE 1 MG
1 TABLET ORAL EVERY 6 HOURS
Qty: 40 TABLET | Refills: 0 | Status: SHIPPED | OUTPATIENT
Start: 2022-01-11 | End: 2022-01-21

## 2022-01-11 ASSESSMENT — LIFESTYLE VARIABLES: SMOKING_STATUS: NO, I HAVE NEVER SMOKED

## 2022-01-11 NOTE — TELEPHONE ENCOUNTER
Patient called office, pt stated she does need a work note and pt was informed of E-vist. Pt also stated that her job said she could go back to work on 1/18/2022 if her pcp releases her.  Thank you

## 2022-01-11 NOTE — TELEPHONE ENCOUNTER
----- Message from [facility] sent at 1/11/2022  8:48 AM EST -----  Subject: Message to Provider    QUESTIONS  Information for Provider? tested positive for covid on 1/8. would like   advise on what she should do going forward. .  ---------------------------------------------------------------------------  --------------  CALL BACK INFO  What is the best way for the office to contact you? OK to leave message on   voicemail  Preferred Call Back Phone Number? [phone]  ---------------------------------------------------------------------------  --------------  SCRIPT ANSWERS  Relationship to Patient?  Self

## 2022-01-11 NOTE — TELEPHONE ENCOUNTER
Please advise patient she needs to quarantine for 10 days since the onset of symptoms, if she needs letter for work to submit an E-visit, usually the symptoms are treated with some medications,

## 2022-01-11 NOTE — PROGRESS NOTES
HPI: per patient's questionnaire & telephone encounter from 1/1/2022    \"QUESTIONS  Information for Provider? tested positive for covid on 1/8. would like   advise on what she should do going forward. Baltazar Drew \"Cher Senior     AT    1/11/22 9:31 AM  Note  Patient called office, pt stated she does need a work note and pt was informed of [employer]. Pt also stated that her job said she could go back to work on 1/18/2022 if her pcp releases her. Thank you             EXAM: not applicable    Diagnoses and all orders for this visit:    1. COVID-19 virus infection  Ongoing  - dexamethasone (DECADRON) 1 MG tablet; Take 1 tablet by mouth every 6 hours for 10 days With low carb low salt diet  Dispense: 40 tablet; Refill: 0  - benzonatate (TESSALON PERLES) 100 MG capsule; Take 1 capsule by mouth 3 times daily as needed for Cough  Dispense: 21 capsule; Refill: 0    Tested positive on 1/8/2022 , 3 days ago  I requested the test report to be sent via Viyet by the patient, I do not have the report  Patient is not vaccinated against COVID-19  No orders of the defined types were placed in this encounter. Letter for work placed      [facility] [employer]  [address] [place] [id number]  Phone: [phone]  Fax: [phone]    Kevin Gordon MD        January 11, 2022     Patient: Shellie Lu   YOB: 1985   Date of Visit: 1/11/2022       To Whom It May Concern: It is my medical opinion that Jero Ngo should be excused from 1/8/2022, through 1/17/2022. Can return to work to full duty on 1/18/2022. Patient has tested positive for COVID-19, she will provide us with a report, she needs to quarantine for 10 days. If you have any questions or concerns, please don't hesitate to call.     Sincerely,        Kevin Gordon MD         Patient was advised to contact PCP if symptoms worsen or failing to change as expected        11-20 minutes were spent on the digital evaluation and management of this patient.   Electronically signed by Maria C Small MD on 1/11/22 at  11:30 AM

## 2022-01-11 NOTE — LETTER
[facility] [employer]  [address] [place] [address] [address] [address]  Phone: [phone]  Fax: [phone]    Rodriguez Medley MD        January 11, 2022     Patient: Megan Jones   YOB: 1985   Date of Visit: 1/11/2022       To Whom It May Concern: It is my medical opinion that Cb Meneses should be excused from 1/8/2022, through 1/17/2022. Can return to work to full duty on 1/18/2022. Patient has tested positive for COVID-19, she will provide us with a report, she needs to quarantine for 10 days. If you have any questions or concerns, please don't hesitate to call.     Sincerely,        Rodriguez Medley MD

## 2022-01-12 DIAGNOSIS — R05.9 COUGH: ICD-10-CM

## 2022-01-12 DIAGNOSIS — R05.9 COUGH: Primary | ICD-10-CM

## 2022-01-12 LAB — SARS-COV-2: POSITIVE

## 2022-01-12 NOTE — RESULT ENCOUNTER NOTE
Noted  Future Appointments  6/1/2022   11:30 AM   Jason Monae MD     [facility]

## 2022-01-12 NOTE — RESULT ENCOUNTER NOTE
COVID test was positive please correct this report, it was detected  Future Appointments  6/1/2022   11:30 AM   MD anamika Arroyo               JIMBOMEGAN

## 2022-06-09 NOTE — PROGRESS NOTES
Visit Information    Have you changed or started any medications since your last visit including any over-the-counter medicines, vitamins, or herbal medicines? no   Have you stopped taking any of your medications? Is so, why? -  yes -   Are you having any side effects from any of your medications? - no    Have you seen any other physician or provider since your last visit?  no   Have you had any other diagnostic tests since your last visit?  no   Have you been seen in the emergency room and/or had an admission in a hospital since we last saw you?  no   Have you had your routine dental cleaning in the past 6 months?  no     Do you have an active MyChart account? If no, what is the barrier?   Yes    Patient Care Team:  Natalia De Leon MD as PCP - General (Family Medicine)  Natalia De Leon MD as PCP - [facility] Provider  Tiera Ngo as Obstetrician (Certified Nurse Midwife)  Sterling Galicia MD as Surgeon (Orthopedic Surgery)    Medical History Review  Past Medical, Family, and Social History reviewed and does contribute to the patient presenting condition    Health Maintenance   Topic Date Due    COVID-19 Vaccine (1) Never done    A1C test (Diabetic or Prediabetic)  05/27/2021    Flu vaccine (Season Ended) 09/01/2022    Depression Screen  12/20/2022    Cervical cancer screen  08/26/2023    DTaP/Tdap/Td vaccine (3 - Td or Tdap) 05/25/2030    Hepatitis C screen  Completed    HIV screen  Completed    Hepatitis A vaccine  Aged Out    Hepatitis B vaccine  Aged Out    Hib vaccine  Aged Out    Meningococcal (ACWY) vaccine  Aged Out    Pneumococcal 0-64 years Vaccine  Aged Out    Varicella vaccine  Discontinued

## 2022-06-10 ENCOUNTER — OFFICE VISIT (OUTPATIENT)
Dept: FAMILY MEDICINE CLINIC | Age: 37
End: 2022-06-10
Payer: COMMERCIAL

## 2022-06-10 VITALS
HEART RATE: 79 BPM | HEIGHT: 62 IN | WEIGHT: 171.4 LBS | BODY MASS INDEX: 31.54 KG/M2 | DIASTOLIC BLOOD PRESSURE: 84 MMHG | OXYGEN SATURATION: 100 % | TEMPERATURE: 98.3 F | SYSTOLIC BLOOD PRESSURE: 108 MMHG

## 2022-06-10 DIAGNOSIS — M25.572 CHRONIC PAIN OF LEFT ANKLE: Primary | ICD-10-CM

## 2022-06-10 DIAGNOSIS — M12.572 TRAUMATIC ARTHRITIS OF LEFT ANKLE: ICD-10-CM

## 2022-06-10 DIAGNOSIS — M25.472 SWOLLEN L ANKLE: ICD-10-CM

## 2022-06-10 DIAGNOSIS — I49.9 IRREGULAR HEART RATE: ICD-10-CM

## 2022-06-10 DIAGNOSIS — R20.0 NUMBNESS AND TINGLING OF FOOT: ICD-10-CM

## 2022-06-10 DIAGNOSIS — G89.29 CHRONIC PAIN OF LEFT ANKLE: Primary | ICD-10-CM

## 2022-06-10 DIAGNOSIS — R53.83 OTHER FATIGUE: ICD-10-CM

## 2022-06-10 DIAGNOSIS — E55.9 VITAMIN D DEFICIENCY: ICD-10-CM

## 2022-06-10 DIAGNOSIS — R73.9 HYPERGLYCEMIA: ICD-10-CM

## 2022-06-10 DIAGNOSIS — E78.5 HYPERLIPIDEMIA WITH TARGET LDL LESS THAN 100: ICD-10-CM

## 2022-06-10 DIAGNOSIS — R20.2 NUMBNESS AND TINGLING OF FOOT: ICD-10-CM

## 2022-06-10 LAB — HBA1C MFR BLD: 4.8 %

## 2022-06-10 PROCEDURE — 1036F TOBACCO NON-USER: CPT | Performed by: FAMILY MEDICINE

## 2022-06-10 PROCEDURE — 83036 HEMOGLOBIN GLYCOSYLATED A1C: CPT | Performed by: FAMILY MEDICINE

## 2022-06-10 PROCEDURE — G8417 CALC BMI ABV UP PARAM F/U: HCPCS | Performed by: FAMILY MEDICINE

## 2022-06-10 PROCEDURE — 99214 OFFICE O/P EST MOD 30 MIN: CPT | Performed by: FAMILY MEDICINE

## 2022-06-10 PROCEDURE — G8427 DOCREV CUR MEDS BY ELIG CLIN: HCPCS | Performed by: FAMILY MEDICINE

## 2022-06-10 RX ORDER — ARNICA MONTANA 1 [HP_X]/G
GEL TOPICAL
Qty: 30 G | Refills: 0
Start: 2022-06-10

## 2022-06-10 RX ORDER — AMITRIPTYLINE HYDROCHLORIDE 10 MG/1
10-20 TABLET, FILM COATED ORAL NIGHTLY
Qty: 60 TABLET | Refills: 0 | Status: SHIPPED | OUTPATIENT
Start: 2022-06-10 | End: 2022-07-07

## 2022-06-10 SDOH — ECONOMIC STABILITY: FOOD INSECURITY: WITHIN THE PAST 12 MONTHS, YOU WORRIED THAT YOUR FOOD WOULD RUN OUT BEFORE YOU GOT MONEY TO BUY MORE.: NEVER TRUE

## 2022-06-10 SDOH — ECONOMIC STABILITY: FOOD INSECURITY: WITHIN THE PAST 12 MONTHS, THE FOOD YOU BOUGHT JUST DIDN'T LAST AND YOU DIDN'T HAVE MONEY TO GET MORE.: NEVER TRUE

## 2022-06-10 ASSESSMENT — ENCOUNTER SYMPTOMS
CONSTIPATION: 0
CHEST TIGHTNESS: 0
ABDOMINAL DISTENTION: 0
VOMITING: 0
WHEEZING: 0
NAUSEA: 0
ABDOMINAL PAIN: 0
COUGH: 0
SHORTNESS OF BREATH: 0
DIARRHEA: 0

## 2022-06-10 ASSESSMENT — PATIENT HEALTH QUESTIONNAIRE - PHQ9
SUM OF ALL RESPONSES TO PHQ QUESTIONS 1-9: 0
SUM OF ALL RESPONSES TO PHQ9 QUESTIONS 1 & 2: 0
2. FEELING DOWN, DEPRESSED OR HOPELESS: 0
1. LITTLE INTEREST OR PLEASURE IN DOING THINGS: 0

## 2022-06-10 ASSESSMENT — SOCIAL DETERMINANTS OF HEALTH (SDOH): HOW HARD IS IT FOR YOU TO PAY FOR THE VERY BASICS LIKE FOOD, HOUSING, MEDICAL CARE, AND HEATING?: NOT HARD AT ALL

## 2022-06-10 NOTE — PROGRESS NOTES
Tesha Ambrosio (:  1985) is a 40 y.o. female,Established patient, here for evaluation of the following chief complaint(s): Foot Pain (LEFT/SINCE STARTING NEW /2021), Joint Swelling (Left foot left ankle), Numbness (Left foot, left ankle), and Fatigue      ASSESSMENT/PLAN:    1. Chronic pain of left ankle  Worsening  Continue ibuprofen as needed, IcyHot cream, alternate creams, could try Arnica gel, Bengay as needed  Continue ankle brace  Advised to follow-up with her surgeon  We will start tricyclic antidepressant for pain control  Advised if she wants restrictions, to get FMLA, she says she does not qualify at this time  -     Homeopathic Products (ARNICARE) GEL; Disp-30 g, R-0, NO PRINTApply 2-3 times a day for pain  -     camphor-menthol-methyl salicylate (BENGAY ULTRA STRENGTH) 4-10-30 % CREA cream; Apply topically 3 times daily as needed for Pain, Apply externally, 3 TIMES DAILY PRN Starting Fri 6/10/2022, Disp-113 g, R-0, NO PRINT  -     XR ANKLE LEFT (MIN 3 VIEWS); Future  -To start   amitriptyline (ELAVIL) 10 mg tablet; Take 1-2 tablets by mouth nightly, Disp-60 tablet, R-0Normal  2. Swollen L ankle  Worsening  Elevate, ice, ankle brace, continue ibuprofen as needed  We will rule out inflammatory arthritis and do x-ray to evaluate for malfunctioning hardware  Advised to follow-up with her surgeon  -     Homeopathic Products (ARNICARE) GEL; Disp-30 g, R-0, NO PRINTApply 2-3 times a day for pain  -     camphor-menthol-methyl salicylate (BENGAY ULTRA STRENGTH) 4-10-30 % CREA cream; Apply topically 3 times daily as needed for Pain, Apply externally, 3 TIMES DAILY PRN Starting Fri 6/10/2022, Disp-113 g, R-0, NO PRINT  -     Sedimentation Rate; Future  -     C-Reactive Protein; Future  -     Uric Acid; Future  -     XR ANKLE LEFT (MIN 3 VIEWS); Future  -To start     amitriptyline (ELAVIL) 10 mg tablet; Take 1-2 tablets by mouth nightly, Disp-60 tablet, R-0Normal  3.  Other fatigue  worsening  Will do basic labs to rule out certain common medical conditions: hematologic, renal, hepatic, electrolyte imbalances, thyroid disorders.    -     CBC with Auto Differential; Future  -     Comprehensive Metabolic Panel; Future  -     TSH; Future  4. Hyperlipidemia with target LDL less than 100  Inadequately controlled  Recheck lipid panel, lifestyle changes, low-carb, low-fat diet and exercise, attempt to lose weight  -     Lipid Panel; Future  5. Hyperglycemia  Mild  A1c normal  Low-carb diet advised  -     POCT glycosylated hemoglobin (Hb A1C)    Lab Results   Component Value Date    LABA1C 4.8 06/10/2022    LABA1C 5.3 05/27/2020    LABA1C 4.8 02/22/2019       6. Traumatic arthritis of left ankle   worsening due to wear-and-tear nature of the disease  Follow-up with surgeon, evaluate with x-ray, and start trial of TCA  Ankle brace, topical creams as needed, elevate and ice when at rest  -     XR ANKLE LEFT (MIN 3 VIEWS); Future  -     amitriptyline (ELAVIL) 10 mg tablet; Take 1-2 tablets by mouth nightly, Disp-60 tablet, R-0Normal    7. Numbness and tingling of foot  Failing to resolve  Most likely related to traumatic arthritis and prior ankle surgery  Will need to rule out thyroid disorder and B12 deficiency  Will start TCA to help with pain and likely neuropathy  -     Vitamin B12 & Folate; Future  -     TSH; Future  8. Vitamin D deficiency  Unsure if improving or not. Will recheck level  Continue supplementation.    -     Vitamin D 25 Hydroxy; Future  9. Irregular heart rate--during exam, will need to rule out arrhythmia especially before starting TCA  -     EKG 12 Lead; Future      Annie received counseling on the following healthy behaviors: nutrition, exercise, medication adherence and weight loss. Reviewed prior labs and health maintenance  Discussed use, benefit, and side effects of prescribed medications. Barriers to medication compliance addressed.    Patient given educational materials - see patient instructions  All patient questions answered. Patient voiced understanding. The patient's past medical,surgical, social, and family history as well as her current medications and allergies were reviewed as documented in today's encounter. Medications, labs, diagnostic studies, consultations and follow-up as documented in this encounter. Return in about 3 months (around 9/10/2022) for Visit type PHYSICAL, VISION screen, PHQ9. .    Data Unavailable    Future Appointments   Date Time Provider Verna Diop   10/14/2022 11:30 AM [facility]       SUBJECTIVE/OBJECTIVE:    Ernestina Willoughby complains of worsening left ankle and left foot pain and swelling, associated with numbness and tingling on the dorsum of the foot and lateral side of the ankle. Patient says symptoms flared up since she has changed her job , in August, 10 months ago. She says she now works at [employer] 10 hrs shifts and she has been standing up during the shift. Changed shoes but did not help. Has been wrapping the ankle which helps very little. Has been tried  Ibuprofen 800 mg, and ice which helps very little  Not being on her feet, helps    Patient had prior surgery in April 2015, by Viv Mendoza, and she says she has \"3 screws\" in her left ankle. X-ray 10/19/2018 in epic showed no evidence of hardware loosening complication, and moderate midfoot arthrosis. Intensity of pain is 10/10, located on the left ankle, on the lateral side and  on the dorsum of the foot. Feels warm all the time. She does have decreased range of motion in the ankle which makes the pain worse. Pain does not wake her up at night. Patient says before the pain will be intermittent, but for the past 10 months she is seen constant pain, worse when raining and after work, and when waking up in the morning she says she cannot even walk.       Previously patient had mild hyperglycemia with blood glucose 102 on 3/16/2019, 109 on 12/11/2017, 112 on 12/23/2016. Both her parents have diabetes. Denies increased appetite, thirst or polyuria. A1c normal today. Lab Results   Component Value Date    LABA1C 4.8 06/10/2022    LABA1C 5.3 05/27/2020    LABA1C 4.8 02/22/2019     Intentionally has lost 5 pounds, in 6 months, praise given. Wt Readings from Last 3 Encounters:   06/10/22 171 lb 6.4 oz (77.7 kg)   12/20/21 176 lb (79.8 kg)   03/18/21 177 lb (80.3 kg)         Hyperlipidemia:  Patient is not following a low fat, low cholesterol diet. She is not exercising regularly but staying very active. OTC Supplements: None    LDL is high  Lab Results   Component Value Date    LDLCHOLESTEROL 124 05/30/2020     Lab Results   Component Value Date    TRIG 108 03/16/2019    TRIG 241 (H) 12/11/2017    TRIG 77 12/23/2016       Annie has Vitamin D deficiency. Anabel Ramires  is not taking Vitamin D supplementation, she ran out of the high-dose vitamin D  she feels tired. Lab Results   Component Value Date    VITD25 17.3 (L) 05/30/2020       During the exam irregular heart rate noted, she reports occasional palpitations and feeling like her heart beats fast.  She denies lightheadedness, nausea, chest pain, shortness of breath. She does not drink caffeine or pop. [x]Negative depression screening. PHQ Scores 6/10/2022 12/20/2021 3/18/2021 5/27/2020 2/22/2019 6/12/2018 12/21/2016   PHQ2 Score 0 0 0 0 0 0 0   PHQ9 Score 0 0 0 0 0 0 0         Prior to Visit Medications    Medication Sig Taking?  Authorizing Provider   ibuprofen (ADVIL;MOTRIN) 800 MG tablet Take 1 tablet by mouth every 8 hours as needed for Pain (with food) Yes Brayan Earl MD   lidocaine (LMX) 4 % cream Apply topically every 8 hrs as needed for pain Yes Brayna Earl MD   diclofenac sodium (VOLTAREN) 1 % GEL Apply 2 g topically every 6-8 hours as needed Yes Historical Provider, MD   etonogestrel (NEXPLANON) 68 MG implant Inject 68 mg into the skin Yes Historical Provider, MD   Handicap Placard MISC by Does not apply route . Can't walk greater than 200 feet. Expires in 5 years. Yes Buck Mccallum MD   vitamin D (ERGOCALCIFEROL) 1.25 MG (05437 UT) CAPS capsule Take 1 capsule by mouth once a week  Buck Mccallum MD       Social History     Tobacco Use    Smoking status: Never Smoker    Smokeless tobacco: Never Used   Vaping Use    Vaping Use: Never used   Substance Use Topics    Alcohol use: No    Drug use: No       Review of Systems   Constitutional: Positive for fatigue. Negative for activity change, appetite change, chills, diaphoresis, fever and unexpected weight change. Respiratory: Negative for cough, chest tightness, shortness of breath and wheezing. Cardiovascular: Negative for chest pain, palpitations and leg swelling. Gastrointestinal: Negative for abdominal distention, abdominal pain, constipation, diarrhea, nausea and vomiting. Endocrine: Negative for cold intolerance, heat intolerance, polydipsia, polyphagia and polyuria. Musculoskeletal: Positive for arthralgias (left ankle), gait problem (when in too much pain) and joint swelling (left ankle). Skin: Negative for rash. Neurological: Positive for numbness (left ankle and dorsum of the left foot). Psychiatric/Behavioral: Negative for dysphoric mood. -vital signs stable and within normal limits except obesity per BMI. /84   Pulse 79   Temp 98.3 °F (36.8 °C)   Ht 5' 2\" (1.575 m)   Wt 171 lb 6.4 oz (77.7 kg)   SpO2 100%   BMI 31.35 kg/m²        Physical Exam  Vitals and nursing note reviewed. Constitutional:       General: She is not in acute distress. Appearance: Normal appearance. She is well-developed. She is obese. She is not diaphoretic. HENT:      Head: Normocephalic and atraumatic.       Right Ear: External ear normal.      Left Ear: External ear normal.      Mouth/Throat:      Comments: I did not examine the mouth due to coronavirus pandemic and wearing masks    Eyes: General: Lids are normal. No scleral icterus. Right eye: No discharge. Left eye: No discharge. Extraocular Movements: Extraocular movements intact. Conjunctiva/sclera: Conjunctivae normal.   Neck:      Thyroid: No thyromegaly. Cardiovascular:      Rate and Rhythm: Normal rate and regular rhythm. Occasional extrasystoles are present. Heart sounds: Normal heart sounds. No murmur heard. Pulmonary:      Effort: Pulmonary effort is normal. No respiratory distress. Breath sounds: Normal breath sounds. No wheezing or rales. Chest:      Chest wall: No tenderness. Abdominal:      General: Bowel sounds are normal. There is no distension. Palpations: Abdomen is soft. There is no hepatomegaly or splenomegaly. Tenderness: There is no abdominal tenderness. Comments: Obese abdomen. Musculoskeletal:      Cervical back: Normal range of motion and neck supple. Right lower leg: No edema. Left lower leg: No edema. Right ankle: No swelling. No tenderness. Normal range of motion. Normal pulse. Right Achilles Tendon: Normal.      Left ankle: Swelling present. Tenderness present over the lateral malleolus. Decreased range of motion (Moderate). Normal pulse. Left Achilles Tendon: Normal.      Comments: Old surgical scars noted below the lateral malleolus and on the dorsum of the left foot. Point tenderness just below the lateral malleolus and on the dorsum of the foot. The left ankle is mildly swollen and warm, but no redness     Skin:     General: Skin is warm and dry. Capillary Refill: Capillary refill takes less than 2 seconds. Findings: Rash present. Comments: Acanthosis nigricans on the neck      Neurological:      Mental Status: She is alert and oriented to person, place, and time. Cranial Nerves: No cranial nerve deficit. Motor: No abnormal muscle tone. Gait: Gait abnormal.      Comments: Mild Antalgic gait noted. Psychiatric:         Mood and Affect: Mood normal.         Behavior: Behavior normal.         Thought Content: Thought content normal.         Judgment: Judgment normal.             I personally reviewed testing with patient. Hyperlipidemia  High triglycerides  Vitamin D deficiency.     Mild hyperglycemia  Otherwise labs within normal limits    Lab Results   Component Value Date    WBC 5.5 05/30/2020    HGB 14.1 05/30/2020    HCT 41.9 05/30/2020    MCV 91.1 05/30/2020     05/30/2020       Lab Results   Component Value Date     05/30/2020    K 3.8 05/30/2020     05/30/2020    CO2 22 05/30/2020    BUN 11 05/30/2020    CREATININE 0.50 05/30/2020    GLUCOSE 102 03/16/2019    GLUCOSE 83 11/19/2011    CALCIUM 9.4 05/30/2020        Lab Results   Component Value Date    ALT 31 05/30/2020    AST 18 05/30/2020    ALKPHOS 79 05/30/2020    BILITOT 0.30 05/30/2020       Lab Results   Component Value Date    TSH 1.19 05/30/2020       Lab Results   Component Value Date    CHOL 174 03/16/2019    CHOL 201 (H) 12/11/2017    CHOL 173 12/23/2016     Lab Results   Component Value Date    TRIG 108 03/16/2019    TRIG 241 (H) 12/11/2017    TRIG 77 12/23/2016     Lab Results   Component Value Date    HDL 40 (L) 05/30/2020    HDL 39 (L) 03/16/2019    HDL 38 (L) 12/11/2017     Lab Results   Component Value Date    LDLCHOLESTEROL 124 05/30/2020    LDLCHOLESTEROL 113 03/16/2019    LDLCHOLESTEROL 115 12/11/2017     Lab Results   Component Value Date    CHOLHDLRATIO 4.6 05/30/2020    CHOLHDLRATIO 4.5 03/16/2019    CHOLHDLRATIO 5.3 (H) 12/11/2017       Lab Results   Component Value Date    LABA1C 4.8 06/10/2022       Lab Results   Component Value Date    [id number] 414 02/14/2015       No results found for: FOLATE    Lab Results   Component Value Date    VITD25 17.3 (L) 05/30/2020         Orders Placed This Encounter   Procedures    XR ANKLE LEFT (MIN 3 VIEWS)     Standing Status:   Future     Standing Expiration Date: 6/10/2023     Order Specific Question:   Reason for exam:     Answer:   r/o harware complications    Sedimentation Rate     Standing Status:   Future     Standing Expiration Date:   6/10/2023    C-Reactive Protein     Standing Status:   Future     Standing Expiration Date:   8/7/2022    Uric Acid     Standing Status:   Future     Standing Expiration Date:   6/10/2023    CBC with Auto Differential     Standing Status:   Future     Standing Expiration Date:   6/10/2023    Comprehensive Metabolic Panel     Standing Status:   Future     Standing Expiration Date:   8/7/2022    Lipid Panel     Standing Status:   Future     Standing Expiration Date:   6/10/2023     Order Specific Question:   Is Patient Fasting?/# of Hours     Answer:   8-10 Hours, water ok to drink    Vitamin D 25 Hydroxy     Standing Status:   Future     Standing Expiration Date:   6/10/2023    Vitamin B12 & Folate     Standing Status:   Future     Standing Expiration Date:   8/7/2022    TSH     Standing Status:   Future     Standing Expiration Date:   6/10/2023    POCT glycosylated hemoglobin (Hb A1C)    EKG 12 Lead     Standing Status:   Future     Standing Expiration Date:   6/10/2023     Order Specific Question:   Reason for Exam?     Answer:   Irregular heart rate       Orders Placed This Encounter   Medications    Homeopathic Products (ARNICARE) GEL     Sig: Apply 2-3 times a day for pain     Dispense:  30 g     Refill:  0    camphor-menthol-methyl salicylate (BENGAY ULTRA STRENGTH) 4-10-30 % CREA cream     Sig: Apply topically 3 times daily as needed for Pain     Dispense:  113 g     Refill:  0    amitriptyline (ELAVIL) 10 mg tablet     Sig: Take 1-2 tablets by mouth nightly     Dispense:  60 tablet     Refill:  0       Medications Discontinued During This Encounter   Medication Reason    vitamin D (ERGOCALCIFEROL) 1.25 MG (68616 UT) CAPS capsule Therapy completed           On this date 6/10/2022 I have spent 35 minutes reviewing previous notes, test results and face to face with the patient discussing the diagnosis and importance of compliance with the treatment plan as well as documenting on the day of the visit. This note was completed by using the assistance of a speech-recognition program. However, inadvertent computerized transcription errors may be present. Although every effort was made to ensure accuracy, no guarantees can be provided that every mistake has been identified and corrected by editing. An electronic signature was used to authenticate this note.   Electronically signed by Jolly Armando MD on 6/11/2022 at 9:14 AM

## 2022-06-11 PROBLEM — R20.0 NUMBNESS AND TINGLING OF FOOT: Status: ACTIVE | Noted: 2022-06-11

## 2022-06-11 PROBLEM — R20.2 NUMBNESS AND TINGLING OF FOOT: Status: ACTIVE | Noted: 2022-06-11

## 2022-06-11 PROBLEM — I49.9 IRREGULAR HEART RATE: Status: ACTIVE | Noted: 2022-06-11

## 2022-06-11 PROBLEM — E66.09 CLASS 1 OBESITY DUE TO EXCESS CALORIES WITH SERIOUS COMORBIDITY AND BODY MASS INDEX (BMI) OF 32.0 TO 32.9 IN ADULT: Status: RESOLVED | Noted: 2021-03-18 | Resolved: 2022-06-11

## 2022-06-11 PROBLEM — E66.811 CLASS 1 OBESITY DUE TO EXCESS CALORIES WITH SERIOUS COMORBIDITY AND BODY MASS INDEX (BMI) OF 32.0 TO 32.9 IN ADULT: Status: RESOLVED | Noted: 2021-03-18 | Resolved: 2022-06-11

## 2022-06-11 PROBLEM — R53.83 OTHER FATIGUE: Status: ACTIVE | Noted: 2019-02-22

## 2022-06-11 PROBLEM — R73.01 IMPAIRED FASTING GLUCOSE: Status: RESOLVED | Noted: 2017-03-22 | Resolved: 2022-06-11

## 2022-06-11 NOTE — PATIENT INSTRUCTIONS
New Updates for [facility] MyChart/ [facility] ([facility]) URIAH    Thank you for choosing US to give you the best care! [employer] ([facility]) is always trying to think of new ways to help their patients. We are asking all patients to try out the new digital registration that is now available through your [facility] account or the new URIAH, [facility] ([facility]). Via the uriah you're now able to update your personal and registration information prior to your upcoming appointment. This will save you time once you arrive at the office to check-in, not to mention your information remains safe!! Many other perks come from signing up for an account, such as:   Requesting refills   Scheduling an appointment   Completing an [address] Sending a message to the office/provider   Having access to your medication list   Paying your bill/copay prior to your appointment   Scheduling your yearly mammogram   Review your test results    If you are not familiar with [facility] or the [facility] ([facility]) URIAH, please ask one of us and we will be happy to answer any questions or help you set-up your account.       Your [facility] office,  Mari aTeresa

## 2022-07-07 DIAGNOSIS — M12.572 TRAUMATIC ARTHRITIS OF LEFT ANKLE: ICD-10-CM

## 2022-07-07 DIAGNOSIS — R20.0 NUMBNESS AND TINGLING OF FOOT: ICD-10-CM

## 2022-07-07 DIAGNOSIS — M25.572 CHRONIC PAIN OF LEFT ANKLE: ICD-10-CM

## 2022-07-07 DIAGNOSIS — M25.472 SWOLLEN L ANKLE: ICD-10-CM

## 2022-07-07 DIAGNOSIS — G89.29 CHRONIC PAIN OF LEFT ANKLE: ICD-10-CM

## 2022-07-07 DIAGNOSIS — R20.2 NUMBNESS AND TINGLING OF FOOT: ICD-10-CM

## 2022-07-07 RX ORDER — AMITRIPTYLINE HYDROCHLORIDE 10 MG/1
TABLET, FILM COATED ORAL
Qty: 60 TABLET | Refills: 2 | Status: SHIPPED | OUTPATIENT
Start: 2022-07-07 | End: 2022-09-15 | Stop reason: SDUPTHER

## 2022-07-07 NOTE — TELEPHONE ENCOUNTER
Please Approve or Refuse.   Send to Pharmacy per Pt's Request:      Next Visit Date:  10/14/2022   Last Visit Date: 6/10/2022    Hemoglobin A1C (%)   Date Value   06/10/2022 4.8   05/27/2020 5.3   02/22/2019 4.8             ( goal A1C is < 7)   BP Readings from Last 3 Encounters:   06/10/22 108/84   12/20/21 110/80   03/18/21 110/80          (goal 120/80)  BUN   Date Value Ref Range Status   05/30/2020 11 6 - 20 mg/dL Final     CREATININE   Date Value Ref Range Status   05/30/2020 0.50 0.50 - 0.90 mg/dL Final     Potassium   Date Value Ref Range Status   05/30/2020 3.8 3.7 - 5.3 mmol/L Final

## 2022-08-08 ENCOUNTER — HOSPITAL ENCOUNTER (OUTPATIENT)
Age: 37
Discharge: HOME | End: 2022-08-10
Payer: COMMERCIAL

## 2022-08-08 ENCOUNTER — HOSPITAL ENCOUNTER (OUTPATIENT)
Dept: GENERAL RADIOLOGY | Age: 37
Discharge: HOME | End: 2022-08-10
Payer: COMMERCIAL

## 2022-08-08 ENCOUNTER — HOSPITAL ENCOUNTER (OUTPATIENT)
Age: 37
Discharge: HOME | End: 2022-08-08
Payer: COMMERCIAL

## 2022-08-08 DIAGNOSIS — M25.472 SWOLLEN L ANKLE: ICD-10-CM

## 2022-08-08 DIAGNOSIS — R20.0 NUMBNESS AND TINGLING OF FOOT: ICD-10-CM

## 2022-08-08 DIAGNOSIS — I49.9 IRREGULAR HEART RATE: ICD-10-CM

## 2022-08-08 DIAGNOSIS — G89.29 CHRONIC PAIN OF LEFT ANKLE: ICD-10-CM

## 2022-08-08 DIAGNOSIS — R53.83 OTHER FATIGUE: ICD-10-CM

## 2022-08-08 DIAGNOSIS — E55.9 VITAMIN D DEFICIENCY: ICD-10-CM

## 2022-08-08 DIAGNOSIS — E78.5 HYPERLIPIDEMIA WITH TARGET LDL LESS THAN 100: ICD-10-CM

## 2022-08-08 DIAGNOSIS — R20.2 NUMBNESS AND TINGLING OF FOOT: ICD-10-CM

## 2022-08-08 DIAGNOSIS — M25.572 CHRONIC PAIN OF LEFT ANKLE: ICD-10-CM

## 2022-08-08 DIAGNOSIS — M12.572 TRAUMATIC ARTHRITIS OF LEFT ANKLE: ICD-10-CM

## 2022-08-08 LAB
ABSOLUTE EOS #: 0.1 K/UL (ref 0–0.4)
ABSOLUTE LYMPH #: 1.4 K/UL (ref 1–4.8)
ABSOLUTE MONO #: 0.4 K/UL (ref 0.1–1.3)
ALBUMIN SERPL-MCNC: 4.6 G/DL (ref 3.5–5.2)
ALP BLD-CCNC: 71 U/L (ref 35–104)
ALT SERPL-CCNC: 26 U/L (ref 5–33)
ANION GAP SERPL CALCULATED.3IONS-SCNC: 10 MMOL/L (ref 9–17)
AST SERPL-CCNC: 25 U/L
BASOPHILS # BLD: 1 % (ref 0–2)
BASOPHILS ABSOLUTE: 0 K/UL (ref 0–0.2)
BILIRUB SERPL-MCNC: 0.18 MG/DL (ref 0.3–1.2)
BUN BLDV-MCNC: 13 MG/DL (ref 6–20)
C-REACTIVE PROTEIN: <3 MG/L (ref 0–5)
CALCIUM SERPL-MCNC: 9.8 MG/DL (ref 8.6–10.4)
CHLORIDE BLD-SCNC: 104 MMOL/L (ref 98–107)
CHOLESTEROL/HDL RATIO: 5
CHOLESTEROL: 219 MG/DL
CO2: 26 MMOL/L (ref 20–31)
CREAT SERPL-MCNC: 0.47 MG/DL (ref 0.5–0.9)
EOSINOPHILS RELATIVE PERCENT: 1 % (ref 0–4)
FOLATE: 12.7 NG/ML
GFR AFRICAN AMERICAN: >60 ML/MIN
GFR NON-AFRICAN AMERICAN: >60 ML/MIN
GFR SERPL CREATININE-BSD FRML MDRD: ABNORMAL ML/MIN/{1.73_M2}
GLUCOSE BLD-MCNC: 93 MG/DL (ref 70–99)
HCT VFR BLD CALC: 43.5 % (ref 36–46)
HDLC SERPL-MCNC: 44 MG/DL
HEMOGLOBIN: 14.4 G/DL (ref 12–16)
LDL CHOLESTEROL: 115 MG/DL (ref 0–130)
LYMPHOCYTES # BLD: 20 % (ref 24–44)
MCH RBC QN AUTO: 30.2 PG (ref 26–34)
MCHC RBC AUTO-ENTMCNC: 33.1 G/DL (ref 31–37)
MCV RBC AUTO: 91 FL (ref 80–100)
MONOCYTES # BLD: 5 % (ref 1–7)
PDW BLD-RTO: 13.7 % (ref 11.5–14.9)
PLATELET # BLD: 223 K/UL (ref 150–450)
PMV BLD AUTO: 9.6 FL (ref 6–12)
POTASSIUM SERPL-SCNC: 4.3 MMOL/L (ref 3.7–5.3)
RBC # BLD: 4.77 M/UL (ref 4–5.2)
SEDIMENTATION RATE, ERYTHROCYTE: 5 MM/HR (ref 0–20)
SEG NEUTROPHILS: 73 % (ref 36–66)
SEGMENTED NEUTROPHILS ABSOLUTE COUNT: 5.1 K/UL (ref 1.3–9.1)
SODIUM BLD-SCNC: 140 MMOL/L (ref 135–144)
TOTAL PROTEIN: 7.7 G/DL (ref 6.4–8.3)
TRIGL SERPL-MCNC: 302 MG/DL
TSH SERPL DL<=0.05 MIU/L-ACNC: 1.25 UIU/ML (ref 0.3–5)
URIC ACID: 4.2 MG/DL (ref 2.4–5.7)
VITAMIN B-12: 731 PG/ML (ref 232–1245)
VITAMIN D 25-HYDROXY: 17.1 NG/ML
WBC # BLD: 7 K/UL (ref 3.5–11)

## 2022-08-08 PROCEDURE — 80061 LIPID PANEL: CPT

## 2022-08-08 PROCEDURE — 86140 C-REACTIVE PROTEIN: CPT

## 2022-08-08 PROCEDURE — 73610 X-RAY EXAM OF ANKLE: CPT

## 2022-08-08 PROCEDURE — 85652 RBC SED RATE AUTOMATED: CPT

## 2022-08-08 PROCEDURE — 84550 ASSAY OF BLOOD/URIC ACID: CPT

## 2022-08-08 PROCEDURE — 36415 COLL VENOUS BLD VENIPUNCTURE: CPT

## 2022-08-08 PROCEDURE — 82607 VITAMIN B-12: CPT

## 2022-08-08 PROCEDURE — 93005 ELECTROCARDIOGRAM TRACING: CPT

## 2022-08-08 PROCEDURE — 84443 ASSAY THYROID STIM HORMONE: CPT

## 2022-08-08 PROCEDURE — 82746 ASSAY OF FOLIC ACID SERUM: CPT

## 2022-08-08 PROCEDURE — 80053 COMPREHEN METABOLIC PANEL: CPT

## 2022-08-08 PROCEDURE — 85025 COMPLETE CBC W/AUTO DIFF WBC: CPT

## 2022-08-08 PROCEDURE — 82306 VITAMIN D 25 HYDROXY: CPT

## 2022-08-08 NOTE — RESULT ENCOUNTER NOTE
Please notify patient: Stable degenerative changes in the left ankle, postsurgery    Future Appointments  10/14/2022 11:30 AM   Gely Cifuentes MD     [facility]               [address]

## 2022-08-08 NOTE — RESULT ENCOUNTER NOTE
Please notify patient: Vitamin D very low, new prescription for high-dose vitamin D weekly for 3 months sent at the pharmacy, needs to take it with food. High cholesterol but improving  Very high triglycerides, Low carb, low fat diet, increase fruits and vegetables, and exercise 4-5 times a week 30-40 minutes a day, or walk 1-2 hours per day, or wear a pedometer and get at least 10,000 steps per day, no pop, no alcoholic beverages if she drinks any.    Otherwise labs within normal limits  continue current treatment    Future Appointments  10/14/2022 11:30 AM   Judy Grady MD     [facility]               [id number]

## 2022-08-09 LAB
EKG ATRIAL RATE: 63 BPM
EKG P AXIS: 19 DEGREES
EKG P-R INTERVAL: 132 MS
EKG Q-T INTERVAL: 392 MS
EKG QRS DURATION: 84 MS
EKG QTC CALCULATION (BAZETT): 401 MS
EKG R AXIS: 74 DEGREES
EKG T AXIS: 7 DEGREES
EKG VENTRICULAR RATE: 63 BPM

## 2022-08-09 PROCEDURE — 93010 ELECTROCARDIOGRAM REPORT: CPT | Performed by: INTERNAL MEDICINE

## 2022-08-10 DIAGNOSIS — R94.31 ABNORMAL EKG: Primary | ICD-10-CM

## 2022-08-10 DIAGNOSIS — I49.9 IRREGULAR HEART RATE: ICD-10-CM

## 2022-08-10 NOTE — RESULT ENCOUNTER NOTE
Please notify patient: EKG was slightly abnormal, I would like her to see a current neurologist, I will place referral to [facility], please fax the referral and give her contact information to schedule    Future Appointments  10/14/2022 11:30 AM   Rosi Nash MD     [facility]               [address]

## 2022-08-26 DIAGNOSIS — M25.572 CHRONIC PAIN OF LEFT ANKLE: ICD-10-CM

## 2022-08-26 DIAGNOSIS — G89.29 CHRONIC PAIN OF LEFT ANKLE: ICD-10-CM

## 2022-08-26 DIAGNOSIS — M79.672 LEFT FOOT PAIN: ICD-10-CM

## 2022-08-26 DIAGNOSIS — M77.9 TENDONITIS: ICD-10-CM

## 2022-08-26 RX ORDER — IBUPROFEN 800 MG/1
800 TABLET ORAL EVERY 8 HOURS PRN
Qty: 90 TABLET | Refills: 0 | Status: SHIPPED | OUTPATIENT
Start: 2022-08-26

## 2022-08-26 NOTE — TELEPHONE ENCOUNTER
----- Message from Jody Andrew sent at 8/26/2022  3:16 PM EDT -----  Subject: Refill Request    QUESTIONS  Name of Medication? ibuprofen (ADVIL;MOTRIN) 800 MG tablet  Patient-reported dosage and instructions? Take 1 tablet by mouth every 8   hours as needed for Pain (with food)  How many days do you have left? 1  Preferred Pharmacy? [address] [place] [address] [id number]  Pharmacy phone number (if available)? [phone]  ---------------------------------------------------------------------------  --------------  CALL BACK INFO  What is the best way for the office to contact you? OK to leave message on   voicemail  Preferred Call Back Phone Number? [phone]  ---------------------------------------------------------------------------  --------------  SCRIPT ANSWERS  Relationship to Patient?  Self

## 2022-09-14 NOTE — PROGRESS NOTES
Visit Information    Have you changed or started any medications since your last visit including any over-the-counter medicines, vitamins, or herbal medicines? no   Have you stopped taking any of your medications? Is so, why? -  no  Are you having any side effects from any of your medications? - no    Have you seen any other physician or provider since your last visit?  no   Have you had any other diagnostic tests since your last visit?  no   Have you been seen in the emergency room and/or had an admission in a hospital since we last saw you?  no   Have you had your routine dental cleaning in the past 6 months?  no     Do you have an active MyChart account? If no, what is the barrier?   Yes    Patient Care Team:  [facility], MD as PCP - General (Family Medicine)  [facility], MD as PCP - [facility]  Randall Dangelo as Obstetrician (Certified Nurse Midwife)  Eulalio Hodges MD as Surgeon (Orthopedic Surgery)    Medical History Review  Past Medical, Family, and Social History reviewed and does contribute to the patient presenting condition    Health Maintenance   Topic Date Due    COVID-19 Vaccine (1) Never done    Flu vaccine (1) Never done    Depression Screen  06/10/2023    Cervical cancer screen  08/26/2023    DTaP/Tdap/Td vaccine (3 - Td or Tdap) 05/25/2030    Hepatitis C screen  Completed    HIV screen  Completed    Hepatitis A vaccine  Aged Out    Hepatitis B vaccine  Aged Out    Hib vaccine  Aged Out    Meningococcal (ACWY) vaccine  Aged Out    Pneumococcal 0-64 years Vaccine  Aged Out    Varicella vaccine  Discontinued

## 2022-09-15 ENCOUNTER — TELEMEDICINE (OUTPATIENT)
Dept: FAMILY MEDICINE CLINIC | Age: 37
End: 2022-09-15
Payer: COMMERCIAL

## 2022-09-15 DIAGNOSIS — R20.0 NUMBNESS AND TINGLING OF FOOT: ICD-10-CM

## 2022-09-15 DIAGNOSIS — M12.572 TRAUMATIC ARTHRITIS OF LEFT ANKLE: ICD-10-CM

## 2022-09-15 DIAGNOSIS — E55.9 VITAMIN D DEFICIENCY: ICD-10-CM

## 2022-09-15 DIAGNOSIS — R20.2 NUMBNESS AND TINGLING OF FOOT: ICD-10-CM

## 2022-09-15 DIAGNOSIS — Z87.81 H/O FRACTURE OF ANKLE: ICD-10-CM

## 2022-09-15 DIAGNOSIS — M25.572 CHRONIC PAIN OF LEFT ANKLE: Primary | ICD-10-CM

## 2022-09-15 DIAGNOSIS — G89.29 CHRONIC PAIN OF LEFT ANKLE: Primary | ICD-10-CM

## 2022-09-15 PROCEDURE — 99214 OFFICE O/P EST MOD 30 MIN: CPT | Performed by: FAMILY MEDICINE

## 2022-09-15 PROCEDURE — G8427 DOCREV CUR MEDS BY ELIG CLIN: HCPCS | Performed by: FAMILY MEDICINE

## 2022-09-15 RX ORDER — AMITRIPTYLINE HYDROCHLORIDE 25 MG/1
25 TABLET, FILM COATED ORAL NIGHTLY
Qty: 90 TABLET | Refills: 3 | Status: SHIPPED | OUTPATIENT
Start: 2022-09-15

## 2022-09-15 RX ORDER — BACLOFEN 10 MG/1
10 TABLET ORAL 3 TIMES DAILY PRN
Qty: 90 TABLET | Refills: 0 | Status: SHIPPED | OUTPATIENT
Start: 2022-09-15

## 2022-09-15 ASSESSMENT — ENCOUNTER SYMPTOMS
SHORTNESS OF BREATH: 0
WHEEZING: 0
DIARRHEA: 0
COUGH: 0
ABDOMINAL PAIN: 0
VOMITING: 0
ABDOMINAL DISTENTION: 0
CHEST TIGHTNESS: 0
NAUSEA: 0
CONSTIPATION: 0

## 2022-09-15 NOTE — PROGRESS NOTES
9/15/2022    TELEHEALTH EVALUATION -- Audio/Visual (During [place] [address] public health emergency)      Candace Curry (:  1985) is a 40 y.o. female,Established patient, here for evaluation of the following chief complaint(s): Ankle Pain (Left, needs FMLA)        ASSESSMENT/PLAN:    1. Chronic pain of left ankle  Inadequately controlled, but improved   -Dose increased 2022       amitriptyline (ELAVIL) 25 MG tablet; Take 1 tablet by mouth nightly Dose increased 9/15/2022, Disp-90 tablet, R-3Normal  -  start   baclofen (LIORESAL) 10 MG tablet; Take 1 tablet by mouth 3 times daily as needed (MUSCLE SPASMS) Causes sedation, do not drive while taking this medication, Disp-90 tablet, R-0Normal  -     AFL - Audrey Ponce DPM, Podiatry, [place] ([place])  -continue ibuprofen, topical creams: Bengay, Arnica gel, Voltarene gel, elevation and ice, ankle brace      Patient would benefit from FMLA, due to flareups of left ankle pain, swelling, cannot stand up for too long, flaring up the chronic pain   Given FMLA for 2 times per 1 week, lasting 1 day for flareup  Increase amitriptyline, start baclofen, see orthopedic surgeon and new podiatrist  2. Traumatic arthritis of left ankle  Will refer to podiatry for second opinion  -    Dose increased 2022   amitriptyline (ELAVIL) 25 MG tablet; Take 1 tablet by mouth nightly Dose increased 9/15/2022, Disp-90 tablet, R-3Normal  -    start  baclofen (LIORESAL) 10 MG tablet; Take 1 tablet by mouth 3 times daily as needed (MUSCLE SPASMS) Causes sedation, do not drive while taking this medication, Disp-90 tablet, R-0Normal  -     AFL - Loan Haines, MARIO, Podiatry, [place] ([facility])  Sedimentation rate, CRP, and uric acid, normal, which rules out inflammatory arthritis    3. H/O fracture of ankle  -Dose increased 2022       amitriptyline (ELAVIL) 25 MG tablet;  Take 1 tablet by mouth nightly Dose increased 9/15/2022, Disp-90 tablet, R-3Normal  - start    baclofen (LIORESAL) 10 MG tablet; Take 1 tablet by mouth 3 times daily as needed (MUSCLE SPASMS) Causes sedation, do not drive while taking this medication, Disp-90 tablet, R-0Normal  -     LUCAS Munoz DPM, Podiatry, [place] ([address])  4. Numbness and tingling of foot  Likely related to prior trauma  Increase Elavil dosage  -     LUCAS Munoz DPM, Podiatry, [place] ([address])    5. Vitamin D deficiency  To start vitamin D supplement high dosage weekly for 3 months    Annie received counseling on the following healthy behaviors: nutrition, exercise, medication adherence, and weight loss    Reviewed prior labs and health maintenance  Discussed use, benefit, and side effects of prescribed medications. Barriers to medication compliance addressed. Patient given educational materials - see patient instructions  All patient questions answered. Patient voiced understanding. The patient's past medical,surgical, social, and family history as well as her current medications and allergies were reviewed as documented in today's encounter. Medications, labs, diagnostic studies, consultations and follow-up as documented in this encounter. Return in about 3 months (around 12/15/2022) for Visit type PHYSICAL, VISION screen, PHQ9. Left ankle pain. FMLA left at the  in the front printer to be faxed, patient will pick it up today    Future Appointments   Date Time Provider Verna Diop   3/17/2023  9:30 AM Taye Lyn MD [email or web address]         SUBJECTIVE/OBJECTIVE:  Wayne Hutchins (:  1985) has requested an audio/video evaluation for the following concern(s):Ankle Pain (Left, needs FMLA)      Workman Signs complains of of chronic pain in the left ankle  Had Fracture left ankle in  and surgery done by Dr. Ad Ragland with hardware.     Xrays 6/10/2022 showing screws and degenerative  changes  FINDINGS:   Again shown are multiple screws in the midfoot which appear to involve the   navicular and cuboid. No evidence of acute fracture or dislocation. Bone   mineralization and alignment appear intact. Ankle mortise appears intact. No significant regional soft tissue swelling. There are degenerative changes   in the midfoot. Tiny posterior calcaneal spur/enthesophyte overall   appearance is similar to the prior exam         Intensity of pain is 8/10, worse today, having a flareup. Patient says standing up too long, weather changes, even with the first steps in the morning \"it hurts a lot\" and needs to stop  She says she has been taking a lot of ibuprofen lately, also amitriptyline 20 mg every night, which helps a little. She says that in the past she has seen podiatry but no injection was done, apparently she saw Dr. Aaron Koch. She has been following on and off with her orthopedic surgeon but he cannot do anything either. She is agreeable for another referral, to a new podiatrist.  Patient also would like to have FMLA filled for appointments and flareups. Started amitriptiline 20 mg, patient says she has been taking 20 mg which have been helping with the pain but not all the times  Taking ibuprofen every day. Has been also using topical creams    Patient says she Will see ortho who did the surgery on the ankle in 2015. But last time was not able to do anything for her pain    Patient reports numbness and tingling in the foot on the dorsum of the left foot and on the lateral side of the ankle, since 2015, not getting better. Patient reports swelling in the foot when standing up for too long. Patient reports getting more flareups since she has started to work at [email or web address], 10 hours shifts, and has been standing up a lot during the shifts. It makes back pain on and off. Has been using brace, ice, but not always helps. Ronnald Najjar has Vitamin D deficiency. Ronnald Najjar  is not taking Vitamin D supplementation   she feels tired.     Lab Results   Component Value Date VITD25 17.1 (L) 08/08/2022         Review of Systems   Constitutional:  Positive for fatigue. Negative for activity change, appetite change, chills, diaphoresis, fever and unexpected weight change. Respiratory:  Negative for cough, chest tightness, shortness of breath and wheezing. Cardiovascular:  Negative for chest pain, palpitations and leg swelling. Gastrointestinal:  Negative for abdominal distention, abdominal pain, constipation, diarrhea, nausea and vomiting. Musculoskeletal:  Positive for arthralgias (left ankle), back pain (on and off) and joint swelling (left ankle). Neurological:  Positive for numbness (left foot). Prior to Visit Medications    Medication Sig Taking? Authorizing Provider   amitriptyline (ELAVIL) 10 MG tablet Take 1-2 tablet by mouth nightly  Yes Judy Grady MD   ibuprofen (ADVIL;MOTRIN) 800 MG tablet Take 1 tablet by mouth every 8 hours as needed for Pain (with food) Yes Judy Grady MD   Homeopathic Products (ARNICARE) GEL Apply 2-3 times a day for pain Yes Judy Grady MD   camphor-menthol-methyl salicylate (BENGAY ULTRA STRENGTH) 4-10-30 % CREA cream Apply topically 3 times daily as needed for Pain Yes Judy Grady MD   lidocaine (LMX) 4 % cream Apply topically every 8 hrs as needed for pain Yes Vicki Rayo MD   diclofenac sodium (VOLTAREN) 1 % GEL Apply 2 g topically every 6-8 hours as needed Yes Historical Provider, MD   etonogestrel (NEXPLANON) 68 MG implant Inject 68 mg into the skin Yes Historical Provider, MD   Handicap Placard MISC by Does not apply route . Can't walk greater than 200 feet. Expires in 5 years.  Yes Judy Grady MD       Social History     Tobacco Use    Smoking status: Never    Smokeless tobacco: Never   Vaping Use    Vaping Use: Never used   Substance Use Topics    Alcohol use: No    Drug use: No          PHYSICAL EXAMINATION:    Vital Signs: (As obtained by patient/caregiver or practitioner observation)  -vital signs stable and within normal limits except obesity per BMI, BMI 31.35 kg/M   Patient-Reported Vitals 9/14/2022   Patient-Reported Weight 171 pounds   Patient-Reported Height 5 2           Intensity of pain is:8/10       Constitutional: [x] Appears well-developed and well-nourished [x] No apparent distress      [x] Abnormal-obesity      Mental status  [x] Alert and awake  [x] Oriented to person/place/time [x]Able to follow commands      Eyes:  EOM    [x]  Normal  [] Abnormal-  Sclera  [x]  Normal  [] Abnormal -         Discharge [x]  None visible  [] Abnormal -    HENT:   [x] Normocephalic, atraumatic. [] Abnormal   [x] Mouth/Throat: Mucous membranes are moist.     External Ears [x] Normal  [] Abnormal-     Neck: [x] No visualized mass     Pulmonary/Chest: [x] Respiratory effort normal.  [x] No visualized signs of difficulty breathing or respiratory distress        [] Abnormal        Musculoskeletal:   [x] Normal gait with no signs of ataxia         [x] Normal range of motion of neck        [] Abnormal-       Neurological:        [x] No Facial Asymmetry (Cranial nerve 7 motor function) (limited exam to video visit)          [x] No gaze palsy        [] Abnormal-            Skin:        [x] No significant exanthematous lesions or discoloration noted on facial skin         [] Abnormal-            Psychiatric:      [x] No Hallucinations       [x]Mood is normal      [x]Behavior is normal      [x]Judgment is normal      [x]Thought content is normal       [] Abnormal-     Other pertinent observable physical exam findings-at the last appointment, on 6/10/2022, per my note, old surgical scars noted below the lateral left malleolus and on the dorsum of the left foot. Point tenderness just below the left lateral malleolus and on the dorsum of the left foot. The left ankle was mildly swollen, warm, but no redness.     Due to this being a TeleHealth encounter, evaluation of the following organ systems is limited: Vitals/Constitutional/EENT/Resp/CV/GI//MS/Neuro/Skin/Heme-Lymph-Imm. I personally reviewed most recent labs reviewed with the patient and all questions fully answered.    High triglycerides  Hyperlipidemia  Vitamin D deficiency, taking vitamin D supplements  Otherwise labs within normal limits    Lab Results   Component Value Date    SEDRATE 5 08/08/2022     Lab Results   Component Value Date    CRP <3.0 08/08/2022       Lab Results   Component Value Date    URICACID 4.2 08/08/2022       Lab Results   Component Value Date    WBC 7.0 08/08/2022    HGB 14.4 08/08/2022    HCT 43.5 08/08/2022    MCV 91.0 08/08/2022     08/08/2022       Lab Results   Component Value Date/Time     08/08/2022 10:29 AM    K 4.3 08/08/2022 10:29 AM     08/08/2022 10:29 AM    CO2 26 08/08/2022 10:29 AM    BUN 13 08/08/2022 10:29 AM    CREATININE 0.47 08/08/2022 10:29 AM    GLUCOSE 93 08/08/2022 10:29 AM    GLUCOSE 83 11/19/2011 09:56 AM    CALCIUM 9.8 08/08/2022 10:29 AM        Lab Results   Component Value Date    ALT 26 08/08/2022    AST 25 08/08/2022    ALKPHOS 71 08/08/2022    BILITOT 0.18 (L) 08/08/2022       Lab Results   Component Value Date    TSH 1.25 08/08/2022       Lab Results   Component Value Date    CHOL 219 (H) 08/08/2022    CHOL 174 03/16/2019    CHOL 201 (H) 12/11/2017     Lab Results   Component Value Date    TRIG 302 (H) 08/08/2022    TRIG 108 03/16/2019    TRIG 241 (H) 12/11/2017     Lab Results   Component Value Date    HDL 44 08/08/2022    HDL 40 (L) 05/30/2020    HDL 39 (L) 03/16/2019     Lab Results   Component Value Date    LDLCHOLESTEROL 115 08/08/2022    LDLCHOLESTEROL 124 05/30/2020    LDLCHOLESTEROL 113 03/16/2019       Lab Results   Component Value Date    CHOLHDLRATIO 5.0 (H) 08/08/2022    CHOLHDLRATIO 4.6 05/30/2020    CHOLHDLRATIO 4.5 03/16/2019       Lab Results   Component Value Date    LABA1C 4.8 06/10/2022    LABA1C 5.3 05/27/2020    LABA1C 4.8 02/22/2019         Lab Results   Component Value Date    OGVLWOWG94 482 08/08/2022       Lab Results   Component Value Date    VITD25 17.1 (L) 08/08/2022       Orders Placed This Encounter   Medications    amitriptyline (ELAVIL) 25 MG tablet     Sig: Take 1 tablet by mouth nightly Dose increased 9/15/2022     Dispense:  90 tablet     Refill:  3    baclofen (LIORESAL) 10 MG tablet     Sig: Take 1 tablet by mouth 3 times daily as needed (MUSCLE SPASMS) Causes sedation, do not drive while taking this medication     Dispense:  90 tablet     Refill:  0       Orders Placed This Encounter   Procedures    AFL - Anatoly Hallman DPM, Podiatry, [facility] ([place] [facility])     Referral Priority:   Routine     Referral Type:   Eval and Treat     Referral Reason:   Specialty Services Required     Referred to Provider:   Anuja Bernal DPM     Requested Specialty:   Podiatry     Number of Visits Requested:   1       Medications Discontinued During This Encounter   Medication Reason    amitriptyline (ELAVIL) 10 MG tablet [address], was evaluated through a synchronous (real-time) audio-video encounter. The patient (or guardian if applicable) is aware that this is a billable service, which includes applicable co-pays. The virtual visit was conducted with patient's (and/or legal guardian consent). Verbal consent to proceed has been obtained within the past 12 months. The visit was conducted pursuant to the emergency declaration under the [address] [place] [address], [address] [place] [address] authority and the [employer] and [employer] General Act. Patient identification was verified    Patient was alone   Provider was located at primary practice location. The patient was located at home, in a state where the provider was licensed to provide care.     On this date 9/15/2022 I have spent 30 minutes reviewing previous notes, test results and face to face with the patient discussing the diagnosis and importance of compliance with the treatment plan as well as documenting on the day of the visit and to complete FMLA. --Ramiro Rebolledo MD on 9/18/2022 at 12:30 PM    An electronic signature was used to authenticate this note.

## 2022-09-18 RX ORDER — ERGOCALCIFEROL 1.25 MG/1
50000 CAPSULE ORAL WEEKLY
Qty: 12 CAPSULE | Refills: 0 | Status: SHIPPED | OUTPATIENT
Start: 2022-09-18

## 2022-09-18 ASSESSMENT — ENCOUNTER SYMPTOMS: BACK PAIN: 1

## 2023-03-17 ENCOUNTER — OFFICE VISIT (OUTPATIENT)
Dept: FAMILY MEDICINE CLINIC | Age: 38
End: 2023-03-17

## 2023-03-17 VITALS
WEIGHT: 178 LBS | DIASTOLIC BLOOD PRESSURE: 60 MMHG | BODY MASS INDEX: 32.76 KG/M2 | TEMPERATURE: 97.9 F | SYSTOLIC BLOOD PRESSURE: 100 MMHG | HEART RATE: 72 BPM | HEIGHT: 62 IN | OXYGEN SATURATION: 98 %

## 2023-03-17 DIAGNOSIS — M79.672 LEFT FOOT PAIN: ICD-10-CM

## 2023-03-17 DIAGNOSIS — Z87.81 H/O FRACTURE OF ANKLE: ICD-10-CM

## 2023-03-17 DIAGNOSIS — G89.29 CHRONIC PAIN OF LEFT ANKLE: ICD-10-CM

## 2023-03-17 DIAGNOSIS — E55.9 VITAMIN D DEFICIENCY: ICD-10-CM

## 2023-03-17 DIAGNOSIS — Z00.01 ENCOUNTER FOR WELL ADULT EXAM WITH ABNORMAL FINDINGS: Primary | ICD-10-CM

## 2023-03-17 DIAGNOSIS — I49.9 IRREGULAR HEART BEATS: ICD-10-CM

## 2023-03-17 DIAGNOSIS — M25.572 CHRONIC PAIN OF LEFT ANKLE: ICD-10-CM

## 2023-03-17 RX ORDER — ERGOCALCIFEROL 1.25 MG/1
50000 CAPSULE ORAL WEEKLY
Qty: 12 CAPSULE | Refills: 0 | Status: SHIPPED | OUTPATIENT
Start: 2023-03-17

## 2023-03-17 RX ORDER — IBUPROFEN 800 MG/1
800 TABLET ORAL EVERY 8 HOURS PRN
Qty: 90 TABLET | Refills: 0 | Status: SHIPPED | OUTPATIENT
Start: 2023-03-17

## 2023-03-17 SDOH — ECONOMIC STABILITY: HOUSING INSECURITY
IN THE LAST 12 MONTHS, WAS THERE A TIME WHEN YOU DID NOT HAVE A STEADY PLACE TO SLEEP OR SLEPT IN A SHELTER (INCLUDING NOW)?: NO

## 2023-03-17 SDOH — ECONOMIC STABILITY: FOOD INSECURITY: WITHIN THE PAST 12 MONTHS, YOU WORRIED THAT YOUR FOOD WOULD RUN OUT BEFORE YOU GOT MONEY TO BUY MORE.: NEVER TRUE

## 2023-03-17 SDOH — ECONOMIC STABILITY: FOOD INSECURITY: WITHIN THE PAST 12 MONTHS, THE FOOD YOU BOUGHT JUST DIDN'T LAST AND YOU DIDN'T HAVE MONEY TO GET MORE.: NEVER TRUE

## 2023-03-17 SDOH — ECONOMIC STABILITY: INCOME INSECURITY: HOW HARD IS IT FOR YOU TO PAY FOR THE VERY BASICS LIKE FOOD, HOUSING, MEDICAL CARE, AND HEATING?: NOT HARD AT ALL

## 2023-03-17 ASSESSMENT — ENCOUNTER SYMPTOMS
SHORTNESS OF BREATH: 0
VOMITING: 0
DIARRHEA: 0
BLOOD IN STOOL: 0
BACK PAIN: 1
ABDOMINAL PAIN: 0
WHEEZING: 0
ABDOMINAL DISTENTION: 0
COUGH: 0
CHEST TIGHTNESS: 0
CONSTIPATION: 0
NAUSEA: 0

## 2023-03-17 ASSESSMENT — PATIENT HEALTH QUESTIONNAIRE - PHQ9
1. LITTLE INTEREST OR PLEASURE IN DOING THINGS: 0
SUM OF ALL RESPONSES TO PHQ QUESTIONS 1-9: 0
2. FEELING DOWN, DEPRESSED OR HOPELESS: 0
SUM OF ALL RESPONSES TO PHQ QUESTIONS 1-9: 0
SUM OF ALL RESPONSES TO PHQ QUESTIONS 1-9: 0
SUM OF ALL RESPONSES TO PHQ9 QUESTIONS 1 & 2: 0
SUM OF ALL RESPONSES TO PHQ QUESTIONS 1-9: 0

## 2023-03-17 NOTE — PROGRESS NOTES
3/17/2023      Krista Martin (:  1985) is a 41 y.o. female, here for a preventive medicine evaluation, Annual Exam and Ankle Pain (Left side , Fmla )   . ASSESSMENT/PLAN:    1. Encounter for well adult exam with abnormal findings  Low carb, low fat diet, increase fruits and vegetables, and exercise 4-5 times a week 30-40 minutes a day, or walk 1-2 hours per day, or wear a pedometer and get at least 10,000 steps per day. Dental exam 2-3 times /year advised. Immunizations reviewed. Health Maintenance reviewed   Smoking cessation counseling given, not to ever start smoking    Annual eye exam advised. 2. Left foot pain  Failing to improve  Topical creams, Bengay ultra strength, follow-up with orthopedic surgeon  She benefits from FMLA, FMLA completed  -     ibuprofen (ADVIL;MOTRIN) 800 MG tablet; Take 1 tablet by mouth every 8 hours as needed for Pain (with food), Disp-90 tablet, R-0Normal  -     [place] OFFICE/OUTPATIENT ESTABLISHED LOW MDM 20-29 MIN    3. Chronic pain of left ankle  Failing to improve  Topical creams, Bengay ultra strength, follow-up with orthopedic surgeon  She benefits from FMLA, FMLA completed  -     ibuprofen (ADVIL;MOTRIN) 800 MG tablet; Take 1 tablet by mouth every 8 hours as needed for Pain (with food), Disp-90 tablet, R-0Normal  -     [facility] OFFICE/OUTPATIENT ESTABLISHED LOW MDM 20-29 MIN    4. Vitamin D deficiency  Ongoing  Lab Results   Component Value Date    VITD25 17.1 (L) 2022      -    continue vitamin D (ERGOCALCIFEROL) 1.25 MG (35822 UT) CAPS capsule; Take 1 capsule by mouth once a week, Disp-12 capsule, R-0Normal  -     [place] OFFICE/OUTPATIENT ESTABLISHED LOW MDM 20-29 MIN    5. H/O fracture of ankle  Pain failing to improve  Topical creams, Bengay ultra strength, follow-up with orthopedic surgeon  She benefits from FMLA, FMLA completed  -     [place] OFFICE/OUTPATIENT ESTABLISHED LOW MDM 20-29 MIN  6.  Irregular heart beats  -     [place] OFFICE/OUTPATIENT ESTABLISHED LOW MDM 20-29 MIN  -     EKG 12 lead; Future  Has history of PVCs  Could start Magnesium    Annie received counseling on the following healthy behaviors: nutrition, exercise, medication adherence, and weight loss    Reviewed prior labs and health maintenance  Discussed use, benefit, and side effects of prescribed medications. Barriers to medication compliance addressed. Patient given educational materials - see patient instructions  All patient questions answered. Patient voiced understanding. The patient's past medical, surgical, social, and family history as well as her  current medications and allergies were reviewed as documented in today's encounter. Medications, labs, diagnostic studies, consultations and follow-up as documented in thisencounter. Return in about 1 year (around 3/17/2024) for Visit type PHYSICAL, VISION screen, PHQ9. .    6 mo for [facility]    Future Appointments   Date Time Provider Verna Diop   9/13/2023 11:00 AM Nikki Garrison MD [facility]   3/20/2024  9:00 AM Nikki Garrison MD [facility]           Patient Active Problem List   Diagnosis    Obesity (BMI 30.0-34. 9)    Chronic pain of left ankle    Hyperglycemia    Traumatic arthritis of ankle    Vitamin D deficiency    Other fatigue    Hyperlipidemia with target LDL less than 100    Nonspecific reactive hepatitis    Numbness and tingling of foot    Irregular heart rate    H/O fracture of ankle    Left foot pain       Prior to Visit Medications    Medication Sig Taking?  Authorizing Provider   ibuprofen (ADVIL;MOTRIN) 800 MG tablet Take 1 tablet by mouth every 8 hours as needed for Pain (with food) Yes Nikki Garrison MD   vitamin D (ERGOCALCIFEROL) 1.25 MG (65721 UT) CAPS capsule Take 1 capsule by mouth once a week Yes Nikki Garrison MD   baclofen (LIORESAL) 10 MG tablet Take 1 tablet by mouth 3 times daily as needed (MUSCLE SPASMS) Causes sedation, do not drive while taking this medication Yes Mere Wild MD   Homeopathic Products (ARNICARE) GEL Apply 2-3 times a day for pain Yes Mere Wild MD   camphor-menthol-methyl salicylate (BENGAY ULTRA STRENGTH) 4-10-30 % CREA cream Apply topically 3 times daily as needed for Pain Yes Mere Wild MD   lidocaine (LMX) 4 % cream Apply topically every 8 hrs as needed for pain Yes Zhanna Little MD   diclofenac sodium (VOLTAREN) 1 % GEL Apply 2 g topically every 6-8 hours as needed Yes Historical Provider, MD   etonogestrel (NEXPLANON) 68 MG implant Inject 68 mg into the skin Yes Historical Provider, MD   Handicap Placard MISC by Does not apply route . Can't walk greater than 200 feet. Expires in 5 years.  Yes Mere Wild MD   amitriptyline (ELAVIL) 25 MG tablet Take 1 tablet by mouth nightly Dose increased 9/15/2022  Patient not taking: Reported on 3/17/2023  Mere Wild MD        No Known Allergies    Past Medical History:   Diagnosis Date    Calcaneus fracture, left 04/2015    Chronic pain of left ankle 12/21/2016    Class 1 obesity due to excess calories with serious comorbidity and body mass index (BMI) of 32.0 to 32.9 in adult 3/18/2021    Hyperlipidemia with target LDL less than 100 2/22/2019    Left ankle swelling 12/21/2016    Lipoma of torso right flank 6/13/2018    Low grade squamous intraepithelial lesion (LGSIL) on cervicovaginal cytologic smear 12/20/2016    Last PAP 6/1/16 in [address] is within normal limits     Low grade squamous intraepithelial lesion (LGSIL) on cervicovaginal cytologic smear 12/20/2016    Last PAP 6/1/16 in [address] is within normal limits     Navicular fracture, foot 04/2015    Non morbid obesity due to excess calories 12/21/2016    Nonspecific reactive hepatitis 3/16/2019    Obesity (BMI 30.0-34.9) 12/21/2016    Unintended weight gain 12/21/2016       Past Surgical History:   Procedure Laterality Date    FOOT SURGERY Left 04/2015    GALLBLADDER SURGERY  2010    MS EXC B9 LESION MRGN XCP SK TG T/A/L 0.5 CM/< Right 7/17/2018    EXCISION LIPOMA ABDOMINAL WALL performed by Ruba Costa DO at [address] Social History     Tobacco Use    Smoking status: Never    Smokeless tobacco: Never   Vaping Use    Vaping Use: Never used   Substance Use Topics    Alcohol use: No    Drug use: No       Family History   Problem Relation Age of Onset    Diabetes Mother     Diabetes Father     Colon Cancer Neg Hx     Breast Cancer Neg Hx        ADVANCE DIRECTIVE: N, <no information>    HENRY / Jessica Burnett is here for Annual Exam and Ankle Pain (Left side , Fmla )     Patient has chronic left foot and left ankle pain since 2015, she had foot surgery and foot fracture in 2015    Patient says she has appointment on 4/10/2023 with Dr. Catie Jose  Intensity of pain is 10/10, swells and he hurts with walking. Bought new shoes and inserts and cream , and taking ibuprofen 802 prescribed, but the pain is not better. Clint Cornelius has Vitamin D deficiency. Clint Cornelius  is  taking Vitamin D supplementation   she does not feel tired. Lab Results   Component Value Date    VITD25 17.1 (L) 08/08/2022         Urinary symptoms: no    Sexually active: Yes     Contraception: Julane Rump    last pap: was normal  The patient has  history of abnormal PAP    Last mammogram start at 36 yo   The patient has NO family history of breast cancer    Wears seatbelts: yes     Regular exercise: yes  Ever been transfused or tattooed?: no  The patient reports that domestic violence in her life is absent. Last eye exam: up to date: NO    Abnormal visual screening. I advised Elli Garner to make appointment with ophthalmologist. The patient verbalizes understanding and agrees with the plan. Vision Screening    Right eye Left eye Both eyes   Without correction 20/40 20/25 20/20   With correction          Hearing:normal :Yes    Last dental exam and preventative dental cleaning: up to date : Yes    Nutritional assessment:     Body mass index is 32.56 kg/m². Obesity per BMI. Weight is increased   Wt Readings from Last 3 Encounters:   03/17/23 178 lb (80.7 kg)   06/10/22 171 lb 6.4 oz (77.7 kg)   12/20/21 176 lb (79.8 kg)       Healthful diet and Physical activity counseling to prevent CVD- low carb, low fat diet, increase fruits and vegetables, and exercise 4-5 times a day 30-40minutes a day discussed    Nicotine dependence. No  Counseling given: Yes      Alcohol use: no    Immunization History   Administered Date(s) Administered    Tdap (Boostrix, Adacel) 06/12/2018, 05/25/2020       COVID-19 vaccine:  NO     Tdap one time, then Td every 10 years-up to date:  Yes    Influenza annually-up to date:  No, declines    PPSV 23 in all adults 19-63 yo with chronic conditions,smokers, alcoholism,  nursing home residents; then PCV 13 at 74 yo-up to date:  N/A    Menopause: No   Patient's last menstrual period was 03/16/2023 (approximate).      Colon cancer screening recommended at 39years old    The patient has NO family history of colon cancer    Blood pressure is normal.  BP Readings from Last 3 Encounters:   03/17/23 100/60   06/10/22 108/84   12/20/21 110/80       Pulse is normal.  Pulse Readings from Last 3 Encounters:   03/17/23 72   06/10/22 79   12/20/21 80       A1c is within normal limits   Lab Results   Component Value Date    LABA1C 4.8 06/10/2022    LABA1C 5.3 05/27/2020    LABA1C 4.8 02/22/2019       Lipid screening -hypertriglyceridemia and hyperlipidemia: Life style changes discussed      Lab Results   Component Value Date    CHOL 219 (H) 08/08/2022    CHOL 174 03/16/2019    CHOL 201 (H) 12/11/2017     Lab Results   Component Value Date    TRIG 302 (H) 08/08/2022    TRIG 108 03/16/2019    TRIG 241 (H) 12/11/2017     Lab Results   Component Value Date    HDL 44 08/08/2022    HDL 40 (L) 05/30/2020    HDL 39 (L) 03/16/2019     Lab Results   Component Value Date    LDLCHOLESTEROL 115 08/08/2022    LDLCHOLESTEROL 124 05/30/2020    LDLCHOLESTEROL 113 03/16/2019     Lab Results   Component Value Date    CHOLHDLRATIO 5.0 (H) 08/08/2022    CHOLHDLRATIO 4.6 05/30/2020    CHOLHDLRATIO 4.5 03/16/2019       Cardiovascular risk is: low    The ASCVD Risk score (Stiven ABURTO, et al., 2019) failed to calculate for the following reasons: The 2019 ASCVD risk score is only valid for ages 36 to 78    Hepatitis C screening- nonreactive   Lab Results   Component Value Date    HEPAIGM NONREACTIVE 03/20/2019    HEPBIGM NONREACTIVE 03/20/2019    HEPCAB NONREACTIVE 03/20/2019            [x]Negative depression screening. PHQ Scores 3/17/2023 6/10/2022 12/20/2021 3/18/2021 5/27/2020 2/22/2019 6/12/2018   PHQ2 Score 0 0 0 0 0 0 0   PHQ9 Score 0 0 0 0 0 0 0       Health Maintenance   Topic Date Due    COVID-19 Vaccine (1) Never done    Flu vaccine (1) 06/30/2023 (Originally 8/1/2022)    Depression Screen  06/10/2023    Cervical cancer screen  08/26/2023    DTaP/Tdap/Td vaccine (3 - Td or Tdap) 05/25/2030    Hepatitis C screen  Completed    HIV screen  Completed    Hepatitis A vaccine  Aged Out    Hib vaccine  Aged Out    Meningococcal (ACWY) vaccine  Aged Out    Pneumococcal 0-64 years Vaccine  Aged Out    Varicella vaccine  Discontinued       -rest of complaints with corresponding details per ROS    Review of Systems   Constitutional:  Positive for unexpected weight change. Negative for activity change, appetite change, chills, diaphoresis, fatigue and fever. HENT:  Negative for congestion, dental problem and hearing loss. Eyes:  Positive for visual disturbance. Respiratory:  Negative for cough, chest tightness, shortness of breath and wheezing. Cardiovascular:  Negative for chest pain, palpitations and leg swelling. Gastrointestinal:  Negative for abdominal distention, abdominal pain, blood in stool, constipation, diarrhea, nausea and vomiting. Endocrine: Negative for cold intolerance, heat intolerance, polydipsia, polyphagia and polyuria.    Genitourinary:  Negative for difficulty urinating, dysuria, frequency, urgency and vaginal pain. Musculoskeletal:  Positive for arthralgias (left ankle), back pain (on and off), gait problem (when in pain) and joint swelling (left ankle). Negative for myalgias. Skin:  Negative for rash. Allergic/Immunologic: Negative for environmental allergies. Neurological:  Positive for numbness (left foot). Negative for dizziness and weakness. Hematological:  Does not bruise/bleed easily. Psychiatric/Behavioral:  Negative for dysphoric mood and sleep disturbance. The patient is not nervous/anxious.        -vital signs stable and within normal limits except obesity per BMI    /60   Pulse 72   Temp 97.9 °F (36.6 °C)   Ht 5' 2\" (1.575 m)   Wt 178 lb (80.7 kg)   LMP 03/16/2023 (Approximate)   SpO2 98%   BMI 32.56 kg/m²   Vitals:    03/17/23 0934   BP: 100/60   Pulse: 72   Temp: 97.9 °F (36.6 °C)   SpO2: 98%   Weight: 178 lb (80.7 kg)   Height: 5' 2\" (1.575 m)     Estimated body mass index is 32.56 kg/m² as calculated from the following:    Height as of this encounter: 5' 2\" (1.575 m). Weight as of this encounter: 178 lb (80.7 kg). Physical Exam  Vitals and nursing note reviewed. Constitutional:       General: She is not in acute distress. Appearance: Normal appearance. She is well-developed. She is obese. She is not diaphoretic. HENT:      Head: Normocephalic and atraumatic. Right Ear: Hearing, tympanic membrane, ear canal and external ear normal.      Left Ear: Hearing, tympanic membrane, ear canal and external ear normal.      Nose: No mucosal edema. Mouth/Throat:      Comments: I did not examine the mouth due to coronavirus pandemic and wearing masks    Eyes:      General: Lids are normal. No scleral icterus. Right eye: No discharge. Left eye: No discharge. Extraocular Movements: Extraocular movements intact.       Conjunctiva/sclera: Conjunctivae normal.   Neck:      Thyroid: No thyromegaly. Cardiovascular:      Rate and Rhythm: Normal rate and regular rhythm. Occasional Extrasystoles are present. Heart sounds: Normal heart sounds. No murmur heard. Pulmonary:      Effort: Pulmonary effort is normal. No respiratory distress. Breath sounds: Normal breath sounds. No wheezing or rales. Chest:      Chest wall: No tenderness. Abdominal:      General: Bowel sounds are normal. There is no distension. Palpations: Abdomen is soft. There is no hepatomegaly or splenomegaly. Tenderness: There is no abdominal tenderness. Comments: Obese abdomen. Musculoskeletal:      Cervical back: Normal range of motion and neck supple. Right lower leg: No edema. Left lower leg: No edema. Right ankle: No swelling. No tenderness. Normal range of motion. Normal pulse. Right Achilles Tendon: Normal.      Left ankle: Swelling present. Tenderness present over the lateral malleolus. Decreased range of motion (Moderate). Normal pulse. Left Achilles Tendon: Normal.      Comments: Old surgical scars noted below the lateral malleolus and on the dorsum of the left foot. Point tenderness just below the lateral malleolus and on the dorsum of the foot. The left ankle is mildly swollen . Skin:     General: Skin is warm and dry. Capillary Refill: Capillary refill takes less than 2 seconds. Findings: Rash present. Comments: Acanthosis nigricans on the neck      Neurological:      Mental Status: She is alert and oriented to person, place, and time. Cranial Nerves: No cranial nerve deficit. Motor: No abnormal muscle tone. Gait: Gait abnormal.      Deep Tendon Reflexes: Reflexes normal.      Reflex Scores:       Patellar reflexes are 2+ on the right side and 2+ on the left side. Comments: limping   Psychiatric:         Mood and Affect: Mood normal.         Behavior: Behavior normal.         Thought Content:  Thought content normal. Judgment: Judgment normal.           I personally reviewed testing with patient.   Vitamin D deficiency    Otherwise labs within normal limits      Lab Results   Component Value Date    WBC 7.0 08/08/2022    HGB 14.4 08/08/2022    HCT 43.5 08/08/2022    MCV 91.0 08/08/2022     08/08/2022       Lab Results   Component Value Date/Time     08/08/2022 10:29 AM    K 4.3 08/08/2022 10:29 AM     08/08/2022 10:29 AM    CO2 26 08/08/2022 10:29 AM    BUN 13 08/08/2022 10:29 AM    CREATININE 0.47 08/08/2022 10:29 AM    GLUCOSE 93 08/08/2022 10:29 AM    GLUCOSE 83 11/19/2011 09:56 AM    CALCIUM 9.8 08/08/2022 10:29 AM        Lab Results   Component Value Date    ALT 26 08/08/2022    AST 25 08/08/2022    ALKPHOS 71 08/08/2022    BILITOT 0.18 (L) 08/08/2022       Lab Results   Component Value Date    TSH 1.25 08/08/2022       Lab Results   Component Value Date    LDLCHOLESTEROL 115 08/08/2022       Lab Results   Component Value Date    LABA1C 4.8 06/10/2022       Lab Results   Component Value Date    TOKGTKRE89 731 08/08/2022       Lab Results   Component Value Date    FOLATE 12.7 08/08/2022       Lab Results   Component Value Date    IRON 41 02/14/2015    TIBC 316 02/14/2015    FERRITIN 13 02/14/2015       Lab Results   Component Value Date    VITD25 17.1 (L) 08/08/2022         Orders Placed This Encounter   Medications    ibuprofen (ADVIL;MOTRIN) 800 MG tablet     Sig: Take 1 tablet by mouth every 8 hours as needed for Pain (with food)     Dispense:  90 tablet     Refill:  0    vitamin D (ERGOCALCIFEROL) 1.25 MG (53848 UT) CAPS capsule     Sig: Take 1 capsule by mouth once a week     Dispense:  12 capsule     Refill:  0         Medications Discontinued During This Encounter   Medication Reason    ibuprofen (ADVIL;MOTRIN) 800 MG tablet REORDER    vitamin D (ERGOCALCIFEROL) 1.25 MG (83652 UT) CAPS capsule REORDER         Orders Placed This Encounter   Procedures    EKG 12 lead     Standing Status:   Future Standing Expiration Date:   5/16/2023     Order Specific Question:   Reason for Exam?     Answer:   Irregular heart rate    [place] OFFICE/OUTPATIENT ESTABLISHED LOW MDM 20-29 MIN         This note was completed by using the assistance of a speech-recognition program. However, inadvertent computerized transcription errors may be present. Although every effort was made to ensure accuracy, no guarantees can be provided that every mistake has been identified and corrected by editing. An electronic signature was used to authenticate this note.     Electronically signed by Nikki Garrison MD on 3/17/2023 at 1:03 PM

## 2023-03-17 NOTE — PATIENT INSTRUCTIONS
Starting a Weight Loss Plan: Care Instructions  Overview     If you're thinking about losing weight, it can be hard to know where to start. Your doctor can help you set up a weight loss plan that best meets your needs. You may want to take a class on nutrition or exercise, or you could join a weight loss support group. If you have questions about how to make changes to your eating or exercise habits, ask your doctor about seeing a registered dietitian or an exercise specialist.  It can be a big challenge to lose weight. But you don't have to make huge changes at once. Make small changes, and stick with them. When those changes become habit, add a few more changes. If you don't think you're ready to make changes right now, try to pick a date in the future. Make an appointment to see your doctor to discuss whether the time is right for you to start a plan. Follow-up care is a key part of your treatment and safety. Be sure to make and go to all appointments, and call your doctor if you are having problems. It's also a good idea to know your test results and keep a list of the medicines you take. How can you care for yourself at home? Set realistic goals. Many people expect to lose much more weight than is likely. A weight loss of 5% to 10% of your body weight may be enough to improve your health. Get family and friends involved to provide support. Talk to them about why you are trying to lose weight, and ask them to help. They can help by participating in exercise and having meals with you, even if they may be eating something different. Find what works best for you. If you do not have time or do not like to cook, a program that offers meal replacement bars or shakes may be better for you. Or if you like to prepare meals, finding a plan that includes daily menus and recipes may be best.  Ask your doctor about other health professionals who can help you achieve your weight loss goals.   A dietitian can help you make healthy changes in your diet. An exercise specialist or  can help you develop a safe and effective exercise program.  A counselor or psychiatrist can help you cope with issues such as depression, anxiety, or family problems that can make it hard to focus on weight loss. Consider joining a support group for people who are trying to lose weight. Your doctor can suggest groups in your area. Where can you learn more? Go to http://www.woods.com/ and enter U357 to learn more about \"Starting a Weight Loss Plan: Care Instructions. \"  Current as of: May 9, 2022               Content Version: 13.6  © 2006-2023 [employer]. Care instructions adapted under license by [facility] ([facility]). If you have questions about a medical condition or this instruction, always ask your healthcare professional. [address] any warranty or liability for your use of this information. Well Visit, Ages 25 to 72: Care Instructions  Well visits can help you stay healthy. Your doctor has checked your overall health and may have suggested ways to take good care of yourself. Your doctor also may have recommended tests. You can help prevent illness with healthy eating, good sleep, vaccinations, regular exercise, and other steps. Get the tests that you and your doctor decide on. Depending on your age and risks, examples might include screening for diabetes; hepatitis C; HIV; and cervical, breast, lung, and colon cancer. Screening helps find diseases before any symptoms appear. Eat healthy foods. Choose fruits, vegetables, whole grains, lean protein, and low-fat dairy foods. Limit saturated fat and reduce salt. Limit alcohol. Men should have no more than 2 drinks a day. Women should have no more than 1. For some people, no alcohol is the best choice. Exercise. Get at least 30 minutes of exercise on most days of the week. Walking can be a good choice.      Reach and stay at your healthy weight. This will lower your risk for many health problems. Take care of your mental health. Try to stay connected with friends, family, and community, and find ways to manage stress. If you're feeling depressed or hopeless, talk to someone. A counselor can help. If you don't have a counselor, talk to your doctor. Talk to your doctor if you think you may have a problem with alcohol or drug use. This includes prescription medicines and illegal drugs. Avoid tobacco and nicotine: Don't smoke, vape, or chew. If you need help quitting, talk to your doctor. Practice safer sex. Getting tested, using condoms or dental dams, and limiting sex partners can help prevent STIs. Use birth control if it's important to you to prevent pregnancy. Talk with your doctor about your choices and what might be best for you. Prevent problems where you can. Protect your skin from too much sun, wash your hands, brush your teeth twice a day, and wear a seat belt in the car. Where can you learn more? Go to http://www.ayers.com/ and enter P072 to learn more about \"Well Visit, Ages 25 to 72: Care Instructions. \"  Current as of: November 14, 2022               Content Version: 13.6  © 0116-9661 [employer]. Care instructions adapted under license by [facility] ([facility]). If you have questions about a medical condition or this instruction, always ask your healthcare professional. [place] [address] any warranty or liability for your use of this information.

## 2023-03-17 NOTE — PROGRESS NOTES
Visit Information    Have you changed or started any medications since your last visit including any over-the-counter medicines, vitamins, or herbal medicines? no   Are you having any side effects from any of your medications? -  no  Have you stopped taking any of your medications? Is so, why? -  no    Have you seen any other physician or provider since your last visit? No  Have you had any other diagnostic tests since your last visit? No  Have you been seen in the emergency room and/or had an admission to a hospital since we last saw you? No  Have you had your routine dental cleaning in the past 6 months? yes     Have you activated your [employer] account? If not, what are your barriers?  Yes     Patient Care Team:  Dinesh Araiza MD as PCP - General (Family Medicine)  Dinesh Araiza MD as PCP - Empaneled Provider  Alesia Murillo as Obstetrician (Certified Nurse Midwife)  Mague Almanzar MD as Surgeon (Orthopedic Surgery)    Medical History Review  Past Medical, Family, and Social History reviewed and does contribute to the patient presenting condition    Health Maintenance   Topic Date Due    COVID-19 Vaccine (1) Never done    Flu vaccine (1) 06/30/2023 (Originally 8/1/2022)    Depression Screen  06/10/2023    Cervical cancer screen  08/26/2023    DTaP/Tdap/Td vaccine (3 - Td or Tdap) 05/25/2030    Hepatitis C screen  Completed    HIV screen  Completed    Hepatitis A vaccine  Aged Out    Hib vaccine  Aged Out    Meningococcal (ACWY) vaccine  Aged Out    Pneumococcal 0-64 years Vaccine  Aged Out    Varicella vaccine  Discontinued

## 2023-09-18 ENCOUNTER — OFFICE VISIT (OUTPATIENT)
Dept: FAMILY MEDICINE CLINIC | Age: 38
End: 2023-09-18
Payer: COMMERCIAL

## 2023-09-18 VITALS
BODY MASS INDEX: 34.27 KG/M2 | SYSTOLIC BLOOD PRESSURE: 128 MMHG | OXYGEN SATURATION: 98 % | HEART RATE: 69 BPM | DIASTOLIC BLOOD PRESSURE: 88 MMHG | HEIGHT: 62 IN | WEIGHT: 186.2 LBS | TEMPERATURE: 98.5 F

## 2023-09-18 DIAGNOSIS — M25.572 CHRONIC PAIN OF LEFT ANKLE: Primary | ICD-10-CM

## 2023-09-18 DIAGNOSIS — R73.9 HYPERGLYCEMIA: ICD-10-CM

## 2023-09-18 DIAGNOSIS — E55.9 VITAMIN D DEFICIENCY: ICD-10-CM

## 2023-09-18 DIAGNOSIS — G89.29 CHRONIC PAIN OF LEFT ANKLE: Primary | ICD-10-CM

## 2023-09-18 DIAGNOSIS — E78.5 HYPERLIPIDEMIA WITH TARGET LDL LESS THAN 100: ICD-10-CM

## 2023-09-18 DIAGNOSIS — Z11.59 SCREENING FOR VIRAL DISEASE: ICD-10-CM

## 2023-09-18 DIAGNOSIS — R53.83 OTHER FATIGUE: ICD-10-CM

## 2023-09-18 LAB — HBA1C MFR BLD: 5.4 %

## 2023-09-18 PROCEDURE — G8427 DOCREV CUR MEDS BY ELIG CLIN: HCPCS | Performed by: NURSE PRACTITIONER

## 2023-09-18 PROCEDURE — 83036 HEMOGLOBIN GLYCOSYLATED A1C: CPT | Performed by: NURSE PRACTITIONER

## 2023-09-18 PROCEDURE — G8417 CALC BMI ABV UP PARAM F/U: HCPCS | Performed by: NURSE PRACTITIONER

## 2023-09-18 PROCEDURE — 1036F TOBACCO NON-USER: CPT | Performed by: NURSE PRACTITIONER

## 2023-09-18 PROCEDURE — 99215 OFFICE O/P EST HI 40 MIN: CPT | Performed by: NURSE PRACTITIONER

## 2023-09-18 RX ORDER — MELOXICAM 15 MG/1
15 TABLET ORAL DAILY
COMMUNITY
Start: 2023-04-10

## 2023-09-18 SDOH — ECONOMIC STABILITY: INCOME INSECURITY: HOW HARD IS IT FOR YOU TO PAY FOR THE VERY BASICS LIKE FOOD, HOUSING, MEDICAL CARE, AND HEATING?: NOT HARD AT ALL

## 2023-09-18 SDOH — ECONOMIC STABILITY: FOOD INSECURITY: WITHIN THE PAST 12 MONTHS, YOU WORRIED THAT YOUR FOOD WOULD RUN OUT BEFORE YOU GOT MONEY TO BUY MORE.: NEVER TRUE

## 2023-09-18 SDOH — ECONOMIC STABILITY: FOOD INSECURITY: WITHIN THE PAST 12 MONTHS, THE FOOD YOU BOUGHT JUST DIDN'T LAST AND YOU DIDN'T HAVE MONEY TO GET MORE.: NEVER TRUE

## 2023-09-18 ASSESSMENT — PATIENT HEALTH QUESTIONNAIRE - PHQ9
SUM OF ALL RESPONSES TO PHQ9 QUESTIONS 1 & 2: 0
SUM OF ALL RESPONSES TO PHQ QUESTIONS 1-9: 0
1. LITTLE INTEREST OR PLEASURE IN DOING THINGS: 0
2. FEELING DOWN, DEPRESSED OR HOPELESS: 0
SUM OF ALL RESPONSES TO PHQ QUESTIONS 1-9: 0

## 2023-09-18 ASSESSMENT — ENCOUNTER SYMPTOMS
CONSTIPATION: 0
BACK PAIN: 0
SHORTNESS OF BREATH: 0
WHEEZING: 0
VOMITING: 0
ABDOMINAL DISTENTION: 0
ABDOMINAL PAIN: 0
NAUSEA: 0
BLOOD IN STOOL: 0
COUGH: 0
DIARRHEA: 0
CHEST TIGHTNESS: 0

## 2023-09-18 NOTE — PROGRESS NOTES
Visit Information    Have you changed or started any medications since your last visit including any over-the-counter medicines, vitamins, or herbal medicines? yes - MOBIC   Have you stopped taking any of your medications? Is so, why? -  no  Are you having any side effects from any of your medications? - no    Have you seen any other physician or provider since your last visit?  no   Have you had any other diagnostic tests since your last visit?  no   Have you been seen in the emergency room and/or had an admission in a hospital since we last saw you?  no   Have you had your routine dental cleaning in the past 6 months?  no     Do you have an active MyChart account? If no, what is the barrier?   Yes    Patient Care Team:  Camille Dejesus MD as PCP - General (Family Medicine)  Camille Dejesus MD as PCP - Empaneled Provider  Jory David as Obstetrician (Certified Nurse Midwife)  Zenaida Bateman MD as Surgeon (Orthopedic Surgery)    Medical History Review  Past Medical, Family, and Social History reviewed and does contribute to the patient presenting condition    Health Maintenance   Topic Date Due    Hepatitis B vaccine (1 of 3 - 3-dose series) Never done    COVID-19 Vaccine (1) Never done    Flu vaccine (1) Never done    Cervical cancer screen  08/26/2023    Depression Screen  03/17/2024    DTaP/Tdap/Td vaccine (3 - Td or Tdap) 05/25/2030    Hepatitis C screen  Completed    HIV screen  Completed    Hepatitis A vaccine  Aged Out    Hib vaccine  Aged Out    HPV vaccine  Aged Out    Meningococcal (ACWY) vaccine  Aged Out    Pneumococcal 0-64 years Vaccine  Aged Out    A1C test (Diabetic or Prediabetic)  Discontinued    Varicella vaccine  Discontinued    Diabetes screen  Discontinued
side.  Psychiatric:         Mood and Affect: Mood normal.         Behavior: Behavior normal.         Thought Content: Thought content normal.         Judgment: Judgment normal.         I personally reviewed testing with patient, and all questions answered. Lab Results   Component Value Date    WBC 7.0 08/08/2022    HGB 14.4 08/08/2022    HCT 43.5 08/08/2022    MCV 91.0 08/08/2022     08/08/2022       Lab Results   Component Value Date/Time     08/08/2022 10:29 AM    K 4.3 08/08/2022 10:29 AM     08/08/2022 10:29 AM    CO2 26 08/08/2022 10:29 AM    BUN 13 08/08/2022 10:29 AM    CREATININE 0.47 08/08/2022 10:29 AM    GLUCOSE 93 08/08/2022 10:29 AM    GLUCOSE 83 11/19/2011 09:56 AM    CALCIUM 9.8 08/08/2022 10:29 AM        Lab Results   Component Value Date    ALT 26 08/08/2022    AST 25 08/08/2022    ALKPHOS 71 08/08/2022    BILITOT 0.18 (L) 08/08/2022       Lab Results   Component Value Date    TSH 1.25 08/08/2022       Lab Results   Component Value Date    CHOL 219 (H) 08/08/2022    CHOL 174 03/16/2019    CHOL 201 (H) 12/11/2017     Lab Results   Component Value Date    TRIG 302 (H) 08/08/2022    TRIG 108 03/16/2019    TRIG 241 (H) 12/11/2017     Lab Results   Component Value Date    HDL 44 08/08/2022    HDL 40 (L) 05/30/2020    HDL 39 (L) 03/16/2019     Lab Results   Component Value Date    LDLCHOLESTEROL 115 08/08/2022    LDLCHOLESTEROL 124 05/30/2020    LDLCHOLESTEROL 113 03/16/2019     Lab Results   Component Value Date    CHOLHDLRATIO 5.0 (H) 08/08/2022    CHOLHDLRATIO 4.6 05/30/2020    CHOLHDLRATIO 4.5 03/16/2019         Lab Results   Component Value Date    XRWCQRMH26 731 08/08/2022     Lab Results   Component Value Date    FOLATE 12.7 08/08/2022     Lab Results   Component Value Date    VITD25 17.1 (L) 08/08/2022         Return in about 6 months (around 3/20/2024) for with dr Kary Torres .       This note was completed by using the assistance of a speech-recognition program. However,

## 2023-10-04 ENCOUNTER — TELEPHONE (OUTPATIENT)
Dept: FAMILY MEDICINE CLINIC | Age: 38
End: 2023-10-04

## 2023-10-04 DIAGNOSIS — M25.472 LEFT ANKLE SWELLING: ICD-10-CM

## 2023-10-04 DIAGNOSIS — G89.29 CHRONIC PAIN OF LEFT ANKLE: ICD-10-CM

## 2023-10-04 DIAGNOSIS — M25.572 CHRONIC PAIN OF LEFT ANKLE: ICD-10-CM

## 2023-10-04 DIAGNOSIS — M12.572 TRAUMATIC ARTHRITIS OF LEFT ANKLE: Primary | ICD-10-CM

## 2023-10-04 NOTE — TELEPHONE ENCOUNTER
I will put in the box     Diagnosis Orders   1. Traumatic arthritis of left ankle  Handicap Placard MISC      2. Left ankle swelling  Handicap Placard MISC      3.  Chronic pain of left ankle  [facility]           Future Appointments   Date Time Provider [address] [place] [address]   3/20/2024  9:00 AM [facility]

## 2023-10-04 NOTE — TELEPHONE ENCOUNTER
----- Message from Daren Patrick sent at 10/4/2023  1:11 PM EDT -----  Subject: Message to Provider    QUESTIONS  Information for Provider? Patient called stating the order for her New   Handicap placard needs to be redone showing an expiration  ---------------------------------------------------------------------------  --------------  Earl Xie INFO  [phone]; OK to leave message on voicemail  ---------------------------------------------------------------------------  --------------  SCRIPT ANSWERS  Relationship to Patient?  Self

## 2023-10-05 NOTE — TELEPHONE ENCOUNTER
LM for patient to let her know that handicap placard was ready for . The script is up front in filing folders.

## 2024-01-24 DIAGNOSIS — G89.29 CHRONIC PAIN OF LEFT ANKLE: ICD-10-CM

## 2024-01-24 DIAGNOSIS — M25.572 CHRONIC PAIN OF LEFT ANKLE: ICD-10-CM

## 2024-01-24 DIAGNOSIS — M79.672 LEFT FOOT PAIN: ICD-10-CM

## 2024-01-24 RX ORDER — IBUPROFEN 800 MG/1
800 TABLET ORAL EVERY 8 HOURS PRN
Qty: 90 TABLET | Refills: 0 | Status: SHIPPED | OUTPATIENT
Start: 2024-01-24

## 2024-01-24 NOTE — TELEPHONE ENCOUNTER
Please Approve or Refuse.  Send to Pharmacy per Pt's Request:      Next Visit Date:  3/20/2024   Last Visit Date: 9/18/2023    Hemoglobin A1C (%)   Date Value   09/18/2023 5.4   06/10/2022 4.8   05/27/2020 5.3             ( goal A1C is < 7)   BP Readings from Last 3 Encounters:   09/18/23 128/88   03/17/23 100/60   06/10/22 108/84          (goal 120/80)  BUN   Date Value Ref Range Status   08/08/2022 13 6 - 20 mg/dL Final     Creatinine   Date Value Ref Range Status   08/08/2022 0.47 (L) 0.50 - 0.90 mg/dL Final     Potassium   Date Value Ref Range Status   08/08/2022 4.3 3.7 - 5.3 mmol/L Final

## 2024-03-20 ENCOUNTER — OFFICE VISIT (OUTPATIENT)
Dept: FAMILY MEDICINE CLINIC | Age: 39
End: 2024-03-20
Payer: COMMERCIAL

## 2024-03-20 VITALS
HEIGHT: 62 IN | DIASTOLIC BLOOD PRESSURE: 70 MMHG | HEART RATE: 71 BPM | OXYGEN SATURATION: 98 % | BODY MASS INDEX: 33.68 KG/M2 | WEIGHT: 183 LBS | SYSTOLIC BLOOD PRESSURE: 112 MMHG

## 2024-03-20 DIAGNOSIS — Z00.01 ENCOUNTER FOR WELL ADULT EXAM WITH ABNORMAL FINDINGS: Primary | ICD-10-CM

## 2024-03-20 DIAGNOSIS — E66.9 OBESITY (BMI 30.0-34.9): ICD-10-CM

## 2024-03-20 DIAGNOSIS — R73.9 HYPERGLYCEMIA: ICD-10-CM

## 2024-03-20 DIAGNOSIS — E78.5 HYPERLIPIDEMIA WITH TARGET LDL LESS THAN 100: ICD-10-CM

## 2024-03-20 DIAGNOSIS — M12.572 TRAUMATIC ARTHRITIS OF LEFT ANKLE: ICD-10-CM

## 2024-03-20 DIAGNOSIS — E55.9 VITAMIN D DEFICIENCY: ICD-10-CM

## 2024-03-20 DIAGNOSIS — Z11.59 ENCOUNTER FOR SCREENING FOR OTHER VIRAL DISEASES: ICD-10-CM

## 2024-03-20 PROBLEM — R53.83 OTHER FATIGUE: Status: RESOLVED | Noted: 2019-02-22 | Resolved: 2024-03-20

## 2024-03-20 PROCEDURE — 1036F TOBACCO NON-USER: CPT | Performed by: FAMILY MEDICINE

## 2024-03-20 PROCEDURE — G8427 DOCREV CUR MEDS BY ELIG CLIN: HCPCS | Performed by: FAMILY MEDICINE

## 2024-03-20 PROCEDURE — G8417 CALC BMI ABV UP PARAM F/U: HCPCS | Performed by: FAMILY MEDICINE

## 2024-03-20 PROCEDURE — 99395 PREV VISIT EST AGE 18-39: CPT | Performed by: FAMILY MEDICINE

## 2024-03-20 PROCEDURE — 99214 OFFICE O/P EST MOD 30 MIN: CPT | Performed by: FAMILY MEDICINE

## 2024-03-20 PROCEDURE — G8484 FLU IMMUNIZE NO ADMIN: HCPCS | Performed by: FAMILY MEDICINE

## 2024-03-20 RX ORDER — YEAST,DRIED (S. CEREVISIAE)
1 POWDER (GRAM) ORAL DAILY
Qty: 90 TABLET | Refills: 0
Start: 2024-03-20

## 2024-03-20 SDOH — ECONOMIC STABILITY: INCOME INSECURITY: HOW HARD IS IT FOR YOU TO PAY FOR THE VERY BASICS LIKE FOOD, HOUSING, MEDICAL CARE, AND HEATING?: PATIENT DECLINED

## 2024-03-20 SDOH — ECONOMIC STABILITY: FOOD INSECURITY: WITHIN THE PAST 12 MONTHS, YOU WORRIED THAT YOUR FOOD WOULD RUN OUT BEFORE YOU GOT MONEY TO BUY MORE.: NEVER TRUE

## 2024-03-20 SDOH — ECONOMIC STABILITY: FOOD INSECURITY: WITHIN THE PAST 12 MONTHS, THE FOOD YOU BOUGHT JUST DIDN'T LAST AND YOU DIDN'T HAVE MONEY TO GET MORE.: NEVER TRUE

## 2024-03-20 ASSESSMENT — ENCOUNTER SYMPTOMS
CONSTIPATION: 0
BACK PAIN: 0
ABDOMINAL PAIN: 0
VOMITING: 0
CHEST TIGHTNESS: 0
BLOOD IN STOOL: 0
DIARRHEA: 0
COUGH: 0
SHORTNESS OF BREATH: 0
WHEEZING: 0
ABDOMINAL DISTENTION: 0

## 2024-03-20 ASSESSMENT — ANXIETY QUESTIONNAIRES
1. FEELING NERVOUS, ANXIOUS, OR ON EDGE: NOT AT ALL
5. BEING SO RESTLESS THAT IT IS HARD TO SIT STILL: NOT AT ALL
7. FEELING AFRAID AS IF SOMETHING AWFUL MIGHT HAPPEN: NOT AT ALL
GAD7 TOTAL SCORE: 0
IF YOU CHECKED OFF ANY PROBLEMS ON THIS QUESTIONNAIRE, HOW DIFFICULT HAVE THESE PROBLEMS MADE IT FOR YOU TO DO YOUR WORK, TAKE CARE OF THINGS AT HOME, OR GET ALONG WITH OTHER PEOPLE: NOT DIFFICULT AT ALL
3. WORRYING TOO MUCH ABOUT DIFFERENT THINGS: NOT AT ALL
4. TROUBLE RELAXING: NOT AT ALL
2. NOT BEING ABLE TO STOP OR CONTROL WORRYING: NOT AT ALL
6. BECOMING EASILY ANNOYED OR IRRITABLE: NOT AT ALL

## 2024-03-20 ASSESSMENT — PATIENT HEALTH QUESTIONNAIRE - PHQ9
SUM OF ALL RESPONSES TO PHQ QUESTIONS 1-9: 0
1. LITTLE INTEREST OR PLEASURE IN DOING THINGS: NOT AT ALL
SUM OF ALL RESPONSES TO PHQ QUESTIONS 1-9: 0
SUM OF ALL RESPONSES TO PHQ9 QUESTIONS 1 & 2: 0
2. FEELING DOWN, DEPRESSED OR HOPELESS: NOT AT ALL

## 2024-03-20 NOTE — PROGRESS NOTES
Visit Information    Have you changed or started any medications since your last visit including any over-the-counter medicines, vitamins, or herbal medicines? yes -     Have you stopped taking any of your medications? Is so, why? -  no  Are you having any side effects from any of your medications? - no    Have you seen any other physician or provider since your last visit?  yes -     Have you had any other diagnostic tests since your last visit?  no   Have you been seen in the emergency room and/or had an admission in a hospital since we last saw you?  no   Have you had your routine dental cleaning in the past 6 months?  yes - has one scheduled      Do you have an active MyChart account? If no, what is the barrier?  Yes    Patient Care Team:  Jodie Escobar MD as PCP - General (Family Medicine)  Jodie Escobar MD as PCP - Empaneled Provider  Jasmina Bai as Obstetrician (Certified Nurse Midwife)  Emiliano Montana MD as Surgeon (Orthopedic Surgery)    Medical History Review  Past Medical, Family, and Social History reviewed and does contribute to the patient presenting condition    Health Maintenance   Topic Date Due    Hepatitis B vaccine (1 of 3 - 3-dose series) Never done    Cervical cancer screen  08/26/2023    COVID-19 Vaccine (1) 09/01/2024 (Originally 1985)    Flu vaccine (1) 09/18/2024 (Originally 8/1/2023)    Depression Screen  09/18/2024    DTaP/Tdap/Td vaccine (3 - Td or Tdap) 05/25/2030    Hepatitis C screen  Completed    HIV screen  Completed    Hepatitis A vaccine  Aged Out    Hib vaccine  Aged Out    HPV vaccine  Aged Out    Polio vaccine  Aged Out    Meningococcal (ACWY) vaccine  Aged Out    Pneumococcal 0-64 years Vaccine  Aged Out    A1C test (Diabetic or Prediabetic)  Discontinued    Varicella vaccine  Discontinued    Diabetes screen  Discontinued

## 2024-03-20 NOTE — PROGRESS NOTES
3/20/2024      Annie Fisher (:  1985) is a 39 y.o. female, here for a preventive medicine evaluation, Annual Exam, Foot Pain (FMLA , left foot pain ongoing for years, swelling in the foot is worsening, will see orthopedic surgeon), and Immunizations (Will like hep B vaccine today )   .      ASSESSMENT/PLAN:    1. Encounter for well adult exam with abnormal findings  Low carb, low fat diet, increase fruits and vegetables, and exercise 4-5 times a week 30-40 minutes a day, or walk 1-2 hours per day, or wear a pedometer and get at least 10,000 steps per day.    Dental exam 2-3 times /year advised.  Immunizations reviewed.    Health Maintenance reviewed   Smoking cessation counseling given, not to ever start smoking    Annual eye exam advised.    2. Traumatic arthritis of left ankle  Failing to improve  Patient does have appointment with orthopedic surgeon, she says steroid injections usually help  Advised over-the-counter supplements  Discussed ankle brace, icing, Bengay ultra strength, soaking in Epsom salt, Bactrim and as needed, ibuprofen as needed  She continues to benefit from FMLA, she has been on FMLA every 6 months, standing up or walking for too long, makes her pain and swelling worse  -     [place] OFFICE/OUTPATIENT ESTABLISHED MOD MDM 30-39 MIN  -     Glucosamine-Chondroitin-MSM (TRIPLE FLEX) 750-400-375 MG TABS; Take 2 tablets by mouth daily With food. NATURE MADE brand. Or BIOFLEX., Disp-180 tablet, R-3NO PRINT  -     Collagen-Boron-Hyaluronic Acid (MOVE FREE [employer] [facility]) 40-5-3.3 MG TABS; Take 1 tablet by mouth daily, Disp-90 tablet, R-0NO PRINT  Amitriptyline 25 mg daily at night  3. Hyperlipidemia with target LDL less than 100  Inadequately controlled  Recheck labs  Lab Results   Component Value Date    LDLCHOLESTEROL 115 2022       -     [place] OFFICE/OUTPATIENT ESTABLISHED MOD MDM 30-39 MIN  -     CBC; Future  -     Comprehensive Metabolic Panel; Future  -     TSH; Future  -

## 2024-04-04 ENCOUNTER — HOSPITAL ENCOUNTER (OUTPATIENT)
Age: 39
Discharge: HOME | End: 2024-04-04
Payer: COMMERCIAL

## 2024-04-04 DIAGNOSIS — E66.9 OBESITY (BMI 30.0-34.9): ICD-10-CM

## 2024-04-04 DIAGNOSIS — E78.5 HYPERLIPIDEMIA WITH TARGET LDL LESS THAN 100: ICD-10-CM

## 2024-04-04 DIAGNOSIS — Z11.59 ENCOUNTER FOR SCREENING FOR OTHER VIRAL DISEASES: ICD-10-CM

## 2024-04-04 DIAGNOSIS — E55.9 VITAMIN D DEFICIENCY: ICD-10-CM

## 2024-04-04 DIAGNOSIS — R73.9 HYPERGLYCEMIA: ICD-10-CM

## 2024-04-04 LAB
25(OH)D3 SERPL-MCNC: 15 NG/ML (ref 30–100)
ALBUMIN SERPL-MCNC: 4.5 G/DL (ref 3.5–5.2)
ALP SERPL-CCNC: 88 U/L (ref 35–104)
ALT SERPL-CCNC: 26 U/L (ref 5–33)
ANION GAP SERPL CALCULATED.3IONS-SCNC: 13 MMOL/L (ref 9–17)
AST SERPL-CCNC: 19 U/L
BILIRUB SERPL-MCNC: 0.4 MG/DL (ref 0.3–1.2)
BUN SERPL-MCNC: 11 MG/DL (ref 6–20)
CALCIUM SERPL-MCNC: 9.3 MG/DL (ref 8.6–10.4)
CHLORIDE SERPL-SCNC: 104 MMOL/L (ref 98–107)
CHOLEST SERPL-MCNC: 215 MG/DL
CHOLESTEROL/HDL RATIO: 5.4
CO2 SERPL-SCNC: 24 MMOL/L (ref 20–31)
CREAT SERPL-MCNC: 0.5 MG/DL (ref 0.5–0.9)
ERYTHROCYTE [DISTWIDTH] IN BLOOD BY AUTOMATED COUNT: 13.8 % (ref 11.5–14.9)
EST. AVERAGE GLUCOSE BLD GHB EST-MCNC: 103 MG/DL
GFR SERPL CREATININE-BSD FRML MDRD: >90 ML/MIN/1.73M2
GLUCOSE SERPL-MCNC: 99 MG/DL (ref 70–99)
HBA1C MFR BLD: 5.2 % (ref 4–6)
HBV SURFACE AB SERPL IA-ACNC: 7.42 MIU/ML
HCT VFR BLD AUTO: 41.8 % (ref 36–46)
HDLC SERPL-MCNC: 40 MG/DL
HGB BLD-MCNC: 13.9 G/DL (ref 12–16)
LDLC SERPL CALC-MCNC: 152 MG/DL (ref 0–130)
MCH RBC QN AUTO: 30.5 PG (ref 26–34)
MCHC RBC AUTO-ENTMCNC: 33.2 G/DL (ref 31–37)
MCV RBC AUTO: 91.8 FL (ref 80–100)
PLATELET # BLD AUTO: 272 K/UL (ref 150–450)
PMV BLD AUTO: 9.2 FL (ref 6–12)
POTASSIUM SERPL-SCNC: 4.4 MMOL/L (ref 3.7–5.3)
PROT SERPL-MCNC: 7.7 G/DL (ref 6.4–8.3)
RBC # BLD AUTO: 4.56 M/UL (ref 4–5.2)
SODIUM SERPL-SCNC: 141 MMOL/L (ref 135–144)
TRIGL SERPL-MCNC: 117 MG/DL
TSH SERPL DL<=0.05 MIU/L-ACNC: 2.24 UIU/ML (ref 0.3–5)
WBC OTHER # BLD: 6.2 K/UL (ref 3.5–11)

## 2024-04-04 PROCEDURE — 80053 COMPREHEN METABOLIC PANEL: CPT

## 2024-04-04 PROCEDURE — 85027 COMPLETE CBC AUTOMATED: CPT

## 2024-04-04 PROCEDURE — 82306 VITAMIN D 25 HYDROXY: CPT

## 2024-04-04 PROCEDURE — 84443 ASSAY THYROID STIM HORMONE: CPT

## 2024-04-04 PROCEDURE — 86317 IMMUNOASSAY INFECTIOUS AGENT: CPT

## 2024-04-04 PROCEDURE — 80061 LIPID PANEL: CPT

## 2024-04-04 PROCEDURE — 83036 HEMOGLOBIN GLYCOSYLATED A1C: CPT

## 2024-04-04 PROCEDURE — 36415 COLL VENOUS BLD VENIPUNCTURE: CPT

## 2024-04-04 RX ORDER — ERGOCALCIFEROL 1.25 MG/1
50000 CAPSULE ORAL WEEKLY
Qty: 12 CAPSULE | Refills: 0 | Status: SHIPPED | OUTPATIENT
Start: 2024-04-04

## 2024-04-04 NOTE — RESULT ENCOUNTER NOTE
Please notify patient: Low vitamin D, worsening, I will send high dosage vitamin D to take weekly with food for 3 months   high cholesterol worsening, I think she has changed her diet, low-carb diet low-fat diet and exercise, avoid fried food, will recheck again in 6 months  Not immune against hepatitis B she will need hepatitis B vaccine  Otherwise labs within normal limits  continue current treatment    Future Appointments  9/26/2024  9:00 AM    Jodie Escobar MD    [facility]  3/20/2025  9:30 AM    Jodie Escobar MD    [facility]

## 2024-09-26 ENCOUNTER — OFFICE VISIT (OUTPATIENT)
Dept: FAMILY MEDICINE CLINIC | Age: 39
End: 2024-09-26
Payer: COMMERCIAL

## 2024-09-26 VITALS
BODY MASS INDEX: 32.94 KG/M2 | HEIGHT: 62 IN | OXYGEN SATURATION: 97 % | DIASTOLIC BLOOD PRESSURE: 86 MMHG | HEART RATE: 66 BPM | SYSTOLIC BLOOD PRESSURE: 108 MMHG | WEIGHT: 179 LBS

## 2024-09-26 DIAGNOSIS — Z86.79 HISTORY OF CARDIAC ARRHYTHMIA: ICD-10-CM

## 2024-09-26 DIAGNOSIS — E78.2 MIXED HYPERLIPIDEMIA: ICD-10-CM

## 2024-09-26 DIAGNOSIS — M12.572 TRAUMATIC ARTHRITIS OF LEFT ANKLE: ICD-10-CM

## 2024-09-26 DIAGNOSIS — E55.9 VITAMIN D DEFICIENCY: ICD-10-CM

## 2024-09-26 DIAGNOSIS — R73.9 HYPERGLYCEMIA: ICD-10-CM

## 2024-09-26 DIAGNOSIS — M25.572 CHRONIC PAIN OF LEFT ANKLE: Primary | ICD-10-CM

## 2024-09-26 DIAGNOSIS — G89.29 CHRONIC PAIN OF LEFT ANKLE: Primary | ICD-10-CM

## 2024-09-26 PROCEDURE — 1036F TOBACCO NON-USER: CPT | Performed by: FAMILY MEDICINE

## 2024-09-26 PROCEDURE — G8427 DOCREV CUR MEDS BY ELIG CLIN: HCPCS | Performed by: FAMILY MEDICINE

## 2024-09-26 PROCEDURE — 99214 OFFICE O/P EST MOD 30 MIN: CPT | Performed by: FAMILY MEDICINE

## 2024-09-26 PROCEDURE — G8417 CALC BMI ABV UP PARAM F/U: HCPCS | Performed by: FAMILY MEDICINE

## 2024-09-26 RX ORDER — IBUPROFEN 800 MG/1
800 TABLET, FILM COATED ORAL EVERY 8 HOURS PRN
Qty: 90 TABLET | Refills: 0 | Status: SHIPPED | OUTPATIENT
Start: 2024-09-26

## 2024-09-26 SDOH — ECONOMIC STABILITY: FOOD INSECURITY: WITHIN THE PAST 12 MONTHS, YOU WORRIED THAT YOUR FOOD WOULD RUN OUT BEFORE YOU GOT MONEY TO BUY MORE.: NEVER TRUE

## 2024-09-26 SDOH — ECONOMIC STABILITY: FOOD INSECURITY: WITHIN THE PAST 12 MONTHS, THE FOOD YOU BOUGHT JUST DIDN'T LAST AND YOU DIDN'T HAVE MONEY TO GET MORE.: NEVER TRUE

## 2024-09-26 SDOH — ECONOMIC STABILITY: INCOME INSECURITY: HOW HARD IS IT FOR YOU TO PAY FOR THE VERY BASICS LIKE FOOD, HOUSING, MEDICAL CARE, AND HEATING?: NOT HARD AT ALL

## 2024-09-26 ASSESSMENT — PATIENT HEALTH QUESTIONNAIRE - PHQ9
SUM OF ALL RESPONSES TO PHQ QUESTIONS 1-9: 0
2. FEELING DOWN, DEPRESSED OR HOPELESS: NOT AT ALL
SUM OF ALL RESPONSES TO PHQ QUESTIONS 1-9: 0
SUM OF ALL RESPONSES TO PHQ QUESTIONS 1-9: 0
SUM OF ALL RESPONSES TO PHQ9 QUESTIONS 1 & 2: 0
SUM OF ALL RESPONSES TO PHQ QUESTIONS 1-9: 0
1. LITTLE INTEREST OR PLEASURE IN DOING THINGS: NOT AT ALL

## 2024-09-26 NOTE — ASSESSMENT & PLAN NOTE
Chronic, with frequent flareups, FMLA completed and increased intermittently to 3 days/week, follow-up with orthopedic surgeon, follow-up for steroid injections or gel injections, ibuprofen as needed, rest, start physical therapy, continue shoe inserts, continue amitriptyline 25 Mg at night, baclofen as needed, to start triple flex or glucosamine supplement and collagen supplement from over-the-counter.  Continue Voltaren gel as needed, alternate with Bengay ultrasound, Arnica gel as needed, lidocaine cream as needed    Orders:    ibuprofen (ADVIL;MOTRIN) 800 MG tablet; Take 1 tablet by mouth every 8 hours as needed for Pain (with food.  Long-term use can lead to kidney failure and ulcers)    [facility]  [place]  She also has AFO brace

## 2024-09-26 NOTE — ASSESSMENT & PLAN NOTE
Chronic, not at goal (unstable), FMLA completed and increased intermittent leave to 3 days/week, follow-up with orthopedic surgeon, follow-up for steroid injection but discuss to get \"gel\" injections if insurance covers, start physical therapy, ibuprofen as needed, inserts, amitriptyline 25 Mg at nighttime, baclofen as needed, to start triple flex or glucosamine supplement, collagen supplement from over-the-counter, Voltaren gel as needed, alternate with Bengay ultra strength, Arnica gel as needed, lidocaine cream as needed    Orders:    ibuprofen (ADVIL;MOTRIN) 800 MG tablet; Take 1 tablet by mouth every 8 hours as needed for Pain (with food.  Long-term use can lead to kidney failure and ulcers)    [facility] - [address] [place]      FMLA completed as intermittent 3 days per week  She also has AFO brace

## 2024-09-26 NOTE — PROGRESS NOTES
Annie Fisher (:  1985) is a 39 y.o. female,Established patient, here for evaluation of the following chief complaint(s): Foot Pain (FMLA/DISCUSS MORE TIME OFF DUE TO LEFT FOOT PAIN/PAIN SCORE: 8/10/ SEEN PODIATRY 3 MONTHS AGO INJECTION/HAS NOT CALLED TO DISCUSS NOT FEELING BETTER ), Medication Refill (PAIN MEDICATION), and Immunizations (NO TO ALL VACCINE DUE)      ASSESSMENT/PLAN:    Assessment & Plan  Chronic pain of left ankle   Chronic, not at goal (unstable), FMLA completed and increased intermittent leave to 3 days/week, follow-up with orthopedic surgeon, follow-up for steroid injection but discuss to get \"gel\" injections if insurance covers, start physical therapy, ibuprofen as needed, inserts , amitriptyline 25 Mg at nighttime, baclofen as needed, to start triple flex or glucosamine supplement, collagen supplement from over-the-counter, Voltaren gel as needed, alternate with Bengay ultra strength, Arnica gel as needed, lidocaine cream as needed    Orders:    ibuprofen (ADVIL;MOTRIN) 800 MG tablet; Take 1 tablet by mouth every 8 hours as needed for Pain (with food.  Long-term use can lead to kidney failure and ulcers)    [facility]      FMLA completed as intermittent 3 days per week  She also has AFO brace  Traumatic arthritis of left ankle    Chronic, with frequent flareups, FMLA completed and increased intermittently to 3 days/week, follow-up with orthopedic surgeon, follow-up for steroid injections or gel injections, ibuprofen as needed, rest, start physical therapy, continue shoe inserts, continue amitriptyline 25 Mg at night, baclofen as needed, to start triple flex or glucosamine supplement and collagen supplement from over-the-counter.  Continue Voltaren gel as needed, alternate with Bengay ultrasound, Arnica gel as needed, lidocaine cream as needed    Orders:    ibuprofen (ADVIL;MOTRIN) 800 MG tablet; Take 1 tablet by mouth every 8 hours as needed for Pain (with

## 2024-09-26 NOTE — PROGRESS NOTES
Visit Information    Have you changed or started any medications since your last visit including any over-the-counter medicines, vitamins, or herbal medicines? no   Have you stopped taking any of your medications? Is so, why? -  no  Are you having any side effects from any of your medications? - no    Have you seen any other physician or provider since your last visit?  yes -    Have you had any other diagnostic tests since your last visit?  no   Have you been seen in the emergency room and/or had an admission in a hospital since we last saw you?  no   Have you had your routine dental cleaning in the past 6 months?  no     Do you have an active MyChart account? If no, what is the barrier?  Yes    Patient Care Team:  Jodie Escobar MD as PCP - General (Family Medicine)  Jodie Escobar MD as PCP - Empaneled Provider  Jasmina Bai APRN - CNM as Obstetrician (Certified Nurse Midwife)  Emiliano Montana MD as Surgeon (Orthopedic Surgery)    Medical History Review  Past Medical, Family, and Social History reviewed and does contribute to the patient presenting condition    Health Maintenance   Topic Date Due    Hepatitis B vaccine (1 of 3 - 19+ 3-dose series) Never done    Cervical cancer screen  08/26/2023    Flu vaccine (1) Never done    COVID-19 Vaccine (1 - 2023-24 season) Never done    Depression Screen  03/20/2025    DTaP/Tdap/Td vaccine (3 - Td or Tdap) 05/25/2030    Hepatitis C screen  Completed    HIV screen  Completed    Hepatitis A vaccine  Aged Out    Hib vaccine  Aged Out    HPV vaccine  Aged Out    Polio vaccine  Aged Out    Meningococcal (ACWY) vaccine  Aged Out    Pneumococcal 0-64 years Vaccine  Aged Out    A1C test (Diabetic or Prediabetic)  Discontinued    Varicella vaccine  Discontinued    Diabetes screen  Discontinued

## 2024-09-26 NOTE — ASSESSMENT & PLAN NOTE
Unsure if improving or not.  Will recheck level  Continue supplementation.      Orders:    Vitamin D 25 Hydroxy; Future      Lab Results   Component Value Date    VITD25 15.0 (L) 04/04/2024

## 2024-09-26 NOTE — ASSESSMENT & PLAN NOTE
Mild   blood glucose 102 on 3/16/2019,109 on 12/11/2017, 112 on 12/23/2016  Orders:    Hemoglobin A1C; Future  Both parents have diabetes, she has a high risk of type 2 diabetes mellitus  Low-carb diet advised and especially to stop drinking pop

## 2024-09-30 PROBLEM — E78.2 MIXED HYPERLIPIDEMIA: Status: ACTIVE | Noted: 2019-02-22

## 2024-09-30 NOTE — ASSESSMENT & PLAN NOTE
Chronic, not at goal, recheck lipid panel, low-carb diet, low-fat diet, try to exercise and attempt to lose weight      Lab Results   Component Value Date     (H) 04/04/2024   Recheck lipid panel  If LDL still high, then we will start statin  Orders:    Lipid Panel; Future

## 2024-10-31 ENCOUNTER — HOSPITAL ENCOUNTER (OUTPATIENT)
Age: 39
Discharge: HOME | End: 2024-10-31
Payer: COMMERCIAL

## 2024-10-31 DIAGNOSIS — E55.9 VITAMIN D DEFICIENCY: ICD-10-CM

## 2024-10-31 DIAGNOSIS — E78.2 MIXED HYPERLIPIDEMIA: ICD-10-CM

## 2024-10-31 DIAGNOSIS — E55.9 VITAMIN D DEFICIENCY: Primary | ICD-10-CM

## 2024-10-31 DIAGNOSIS — Z86.79 HISTORY OF CARDIAC ARRHYTHMIA: ICD-10-CM

## 2024-10-31 DIAGNOSIS — R73.9 HYPERGLYCEMIA: ICD-10-CM

## 2024-10-31 LAB
25(OH)D3 SERPL-MCNC: 15.5 NG/ML (ref 30–100)
ANION GAP SERPL CALCULATED.3IONS-SCNC: 10 MMOL/L (ref 9–16)
BUN SERPL-MCNC: 10 MG/DL (ref 6–20)
CALCIUM SERPL-MCNC: 9 MG/DL (ref 8.6–10.4)
CHLORIDE SERPL-SCNC: 106 MMOL/L (ref 98–107)
CHOLEST SERPL-MCNC: 191 MG/DL (ref 0–199)
CHOLESTEROL/HDL RATIO: 5.5
CO2 SERPL-SCNC: 24 MMOL/L (ref 20–31)
CREAT SERPL-MCNC: 0.6 MG/DL (ref 0.7–1.2)
EST. AVERAGE GLUCOSE BLD GHB EST-MCNC: 111 MG/DL
GFR, ESTIMATED: >90 ML/MIN/1.73M2
GLUCOSE SERPL-MCNC: 99 MG/DL (ref 74–99)
HBA1C MFR BLD: 5.5 % (ref 4–6)
HDLC SERPL-MCNC: 35 MG/DL
LDLC SERPL CALC-MCNC: 105 MG/DL (ref 0–100)
MAGNESIUM SERPL-MCNC: 2 MG/DL (ref 1.6–2.6)
POTASSIUM SERPL-SCNC: 3.8 MMOL/L (ref 3.7–5.3)
SODIUM SERPL-SCNC: 140 MMOL/L (ref 136–145)
TRIGL SERPL-MCNC: 255 MG/DL (ref 0–149)
TSH SERPL DL<=0.05 MIU/L-ACNC: 2 UIU/ML (ref 0.27–4.2)

## 2024-10-31 PROCEDURE — 80048 BASIC METABOLIC PNL TOTAL CA: CPT

## 2024-10-31 PROCEDURE — 84443 ASSAY THYROID STIM HORMONE: CPT

## 2024-10-31 PROCEDURE — 36415 COLL VENOUS BLD VENIPUNCTURE: CPT

## 2024-10-31 PROCEDURE — 83036 HEMOGLOBIN GLYCOSYLATED A1C: CPT

## 2024-10-31 PROCEDURE — 83735 ASSAY OF MAGNESIUM: CPT

## 2024-10-31 PROCEDURE — 82306 VITAMIN D 25 HYDROXY: CPT

## 2024-10-31 PROCEDURE — 80061 LIPID PANEL: CPT

## 2024-10-31 PROCEDURE — 93005 ELECTROCARDIOGRAM TRACING: CPT

## 2024-10-31 RX ORDER — ERGOCALCIFEROL 1.25 MG/1
50000 CAPSULE, LIQUID FILLED ORAL WEEKLY
Qty: 4 CAPSULE | Refills: 2 | Status: SHIPPED | OUTPATIENT
Start: 2024-10-31 | End: 2025-01-17

## 2024-10-31 NOTE — RESULT ENCOUNTER NOTE
Please notify patient: Very low vitamin D I will send high dosage vitamin D to the pharmacy to take with food    Cholesterol is high but improved from prior  High triglycerides to cut down on pop  Potassium is borderline low to eat 1 banana or citrus every day    Otherwise labs within normal limits  continue current treatment    Future Appointments  3/20/2025  9:30 AM    Jodie Escobar MD    fp sc               [facility] DEP

## 2024-11-02 DIAGNOSIS — R94.31 ABNORMAL EKG: Primary | ICD-10-CM

## 2024-11-02 LAB
EKG ATRIAL RATE: 68 BPM
EKG P AXIS: 58 DEGREES
EKG P-R INTERVAL: 138 MS
EKG Q-T INTERVAL: 404 MS
EKG QRS DURATION: 82 MS
EKG QTC CALCULATION (BAZETT): 429 MS
EKG R AXIS: 75 DEGREES
EKG T AXIS: 34 DEGREES
EKG VENTRICULAR RATE: 68 BPM

## 2024-11-02 NOTE — RESULT ENCOUNTER NOTE
Please notify patien EKG is mildly abnormal with some nonspecific waves  I will order an echo and a referral to cardiologist for evaluation, to make appointment  Please give her contact information to schedule the echo and the contact information for referral        Future Appointments  3/20/2025  9:30 AM    Jodie Escobar MD    [facility] DEP

## 2025-01-21 ENCOUNTER — TELEMEDICINE (OUTPATIENT)
Dept: FAMILY MEDICINE CLINIC | Age: 40
End: 2025-01-21
Payer: COMMERCIAL

## 2025-01-21 DIAGNOSIS — M12.572 TRAUMATIC ARTHRITIS OF LEFT ANKLE: ICD-10-CM

## 2025-01-21 DIAGNOSIS — G89.29 CHRONIC PAIN OF LEFT ANKLE: Primary | ICD-10-CM

## 2025-01-21 DIAGNOSIS — M25.572 CHRONIC PAIN OF LEFT ANKLE: Primary | ICD-10-CM

## 2025-01-21 DIAGNOSIS — Z87.81 H/O FRACTURE OF ANKLE: ICD-10-CM

## 2025-01-21 PROCEDURE — G8427 DOCREV CUR MEDS BY ELIG CLIN: HCPCS | Performed by: FAMILY MEDICINE

## 2025-01-21 PROCEDURE — 99214 OFFICE O/P EST MOD 30 MIN: CPT | Performed by: FAMILY MEDICINE

## 2025-01-21 RX ORDER — BACLOFEN 10 MG/1
10 TABLET ORAL 3 TIMES DAILY PRN
Qty: 90 TABLET | Refills: 0 | Status: SHIPPED | OUTPATIENT
Start: 2025-01-21

## 2025-01-21 RX ORDER — IBUPROFEN 800 MG/1
800 TABLET, FILM COATED ORAL EVERY 8 HOURS PRN
Qty: 90 TABLET | Refills: 0 | Status: SHIPPED | OUTPATIENT
Start: 2025-01-21

## 2025-01-21 ASSESSMENT — ENCOUNTER SYMPTOMS
ABDOMINAL DISTENTION: 0
DIARRHEA: 0
NAUSEA: 0
COUGH: 0
CHEST TIGHTNESS: 0
SHORTNESS OF BREATH: 0
CONSTIPATION: 0
ABDOMINAL PAIN: 0
WHEEZING: 0
VOMITING: 0

## 2025-01-21 NOTE — ASSESSMENT & PLAN NOTE
Chronic, likely worsening due to wear-and-tear nature of the disease  , Continue custom ankle brace, shoe inserts, amitriptyline, baclofen, ibuprofen as needed, topical creams as needed, intermittent leave for flareups of ankle pain  Patient was also instructed to get over-the-counter supplement with collagen and glucosamine, she says she has been doing  Orders:    amitriptyline (ELAVIL) 25 MG tablet; Take 1 tablet by mouth nightly    baclofen (LIORESAL) 10 MG tablet; Take 1 tablet by mouth 3 times daily as needed (MUSCLE SPASMS) Causes sedation, do not drive while taking this medication    ibuprofen (ADVIL;MOTRIN) 800 MG tablet; Take 1 tablet by mouth every 8 hours as needed for Pain (with food.  Long-term use can lead to kidney failure and ulcers)    X-ray done in [facility] 1/20/2025 shows presence of fixation across navicular bone and significant talonavicular arthritis, report reviewed through care everywhere

## 2025-01-21 NOTE — ASSESSMENT & PLAN NOTE
Stable  Following with orthopedic surgeon  This is the cause of her persistent left ankle pain and traumatic arthritis  Orders:    amitriptyline (ELAVIL) 25 MG tablet; Take 1 tablet by mouth nightly    baclofen (LIORESAL) 10 MG tablet; Take 1 tablet by mouth 3 times daily as needed (MUSCLE SPASMS) Causes sedation, do not drive while taking this medication    ibuprofen (ADVIL;MOTRIN) 800 MG tablet; Take 1 tablet by mouth every 8 hours as needed for Pain (with food.  Long-term use can lead to kidney failure and ulcers)      FMLA completed for intermittent leave and for appointments, during this encounter

## 2025-01-21 NOTE — ASSESSMENT & PLAN NOTE
Chronic, ongoing, failing to resolve, gets intermittent flareups    Continue with custom ankle brace, shoes inserts  Will get a steroid shot in her ankle on Feb 4th, 2025 it is scheduled in [facility]  Continue with amitriptyline at night, baclofen as needed, ibuprofen as needed, intermittent FMLA for flareups of left ankle pain, she also has topical creams  Form completed today, during the encounter, and given to Kellen, to fax and scan in the chart    Orders:    amitriptyline (ELAVIL) 25 MG tablet; Take 1 tablet by mouth nightly    baclofen (LIORESAL) 10 MG tablet; Take 1 tablet by mouth 3 times daily as needed (MUSCLE SPASMS) Causes sedation, do not drive while taking this medication    ibuprofen (ADVIL;MOTRIN) 800 MG tablet; Take 1 tablet by mouth every 8 hours as needed for Pain (with food.  Long-term use can lead to kidney failure and ulcers)

## 2025-01-21 NOTE — PROGRESS NOTES
ulcers)    Traumatic arthritis of left ankle    Chronic, likely worsening due to wear-and-tear nature of the disease  , Continue custom ankle brace, shoe inserts, amitriptyline, baclofen, ibuprofen as needed, topical creams as needed, intermittent leave for flareups of ankle pain  Patient was also instructed to get over-the-counter supplement with collagen and glucosamine, she says she has been doing  Orders:    amitriptyline (ELAVIL) 25 MG tablet; Take 1 tablet by mouth nightly    baclofen (LIORESAL) 10 MG tablet; Take 1 tablet by mouth 3 times daily as needed (MUSCLE SPASMS) Causes sedation, do not drive while taking this medication    ibuprofen (ADVIL;MOTRIN) 800 MG tablet; Take 1 tablet by mouth every 8 hours as needed for Pain (with food.  Long-term use can lead to kidney failure and ulcers)    X[facility] done in [facility] 1/20/[facility]25 shows presence of fixation across navicular bone and significant talonavicular arthritis, report reviewed through care everywhere    H/O fracture of ankle    Stable  Following with orthopedic surgeon  This is the cause of her persistent left ankle pain and traumatic arthritis  Orders:    amitriptyline (ELAVIL) 25 MG tablet; Take 1 tablet by mouth nightly    baclofen (LIORESAL) 10 MG tablet; Take 1 tablet by mouth 3 times daily as needed (MUSCLE SPASMS) Causes sedation, do not drive while taking this medication    ibuprofen (ADVIL;MOTRIN) 800 MG tablet; Take 1 tablet by mouth every 8 hours as needed for Pain (with food.  Long-term use can lead to kidney failure and ulcers)      FMLA completed for intermittent leave and for appointments, during this encounter      Return for [facility] APPT.       Subjective   HPI    Chief Complaint   Patient presents with    Ankle Pain       Patient complains of left ankle pain and pain in her left foot, chronic, for several years, since April 2015 after a fracture.  She had a fracture of the left ankle for which she needed surgical intervention April

## 2025-03-17 ASSESSMENT — PATIENT HEALTH QUESTIONNAIRE - PHQ9
2. FEELING DOWN, DEPRESSED OR HOPELESS: NOT AT ALL
SUM OF ALL RESPONSES TO PHQ QUESTIONS 1-9: 0
SUM OF ALL RESPONSES TO PHQ QUESTIONS 1-9: 0
1. LITTLE INTEREST OR PLEASURE IN DOING THINGS: NOT AT ALL
SUM OF ALL RESPONSES TO PHQ QUESTIONS 1-9: 0
1. LITTLE INTEREST OR PLEASURE IN DOING THINGS: NOT AT ALL
2. FEELING DOWN, DEPRESSED OR HOPELESS: NOT AT ALL
SUM OF ALL RESPONSES TO PHQ QUESTIONS 1-9: 0
SUM OF ALL RESPONSES TO PHQ9 QUESTIONS 1 & 2: 0

## 2025-03-20 ENCOUNTER — OFFICE VISIT (OUTPATIENT)
Dept: FAMILY MEDICINE CLINIC | Age: 40
End: 2025-03-20

## 2025-03-20 ENCOUNTER — TELEPHONE (OUTPATIENT)
Dept: FAMILY MEDICINE CLINIC | Age: 40
End: 2025-03-20

## 2025-03-20 VITALS
OXYGEN SATURATION: 98 % | BODY MASS INDEX: 33.79 KG/M2 | HEART RATE: 86 BPM | SYSTOLIC BLOOD PRESSURE: 108 MMHG | DIASTOLIC BLOOD PRESSURE: 70 MMHG | HEIGHT: 62 IN | WEIGHT: 183.6 LBS | TEMPERATURE: 98.6 F

## 2025-03-20 DIAGNOSIS — Z87.81 H/O FRACTURE OF ANKLE: ICD-10-CM

## 2025-03-20 DIAGNOSIS — R63.5 UNINTENDED WEIGHT GAIN: ICD-10-CM

## 2025-03-20 DIAGNOSIS — N92.0 MENORRHAGIA WITH REGULAR CYCLE: ICD-10-CM

## 2025-03-20 DIAGNOSIS — E78.2 MIXED HYPERLIPIDEMIA: ICD-10-CM

## 2025-03-20 DIAGNOSIS — M12.572 TRAUMATIC ARTHRITIS OF LEFT ANKLE: ICD-10-CM

## 2025-03-20 DIAGNOSIS — R20.2 NUMBNESS AND TINGLING OF FOOT: ICD-10-CM

## 2025-03-20 DIAGNOSIS — R20.0 NUMBNESS AND TINGLING OF FOOT: ICD-10-CM

## 2025-03-20 DIAGNOSIS — E55.9 VITAMIN D DEFICIENCY: ICD-10-CM

## 2025-03-20 DIAGNOSIS — R73.9 HYPERGLYCEMIA: ICD-10-CM

## 2025-03-20 DIAGNOSIS — Z00.01 ENCOUNTER FOR WELL ADULT EXAM WITH ABNORMAL FINDINGS: Primary | ICD-10-CM

## 2025-03-20 DIAGNOSIS — Z23 ENCOUNTER FOR IMMUNIZATION: ICD-10-CM

## 2025-03-20 RX ORDER — ACETAMINOPHEN 500 MG
500 TABLET ORAL EVERY 6 HOURS PRN
Qty: 120 TABLET | Refills: 3
Start: 2025-03-20

## 2025-03-20 RX ORDER — IBUPROFEN 800 MG/1
800 TABLET, FILM COATED ORAL EVERY 8 HOURS PRN
Qty: 90 TABLET | Refills: 0 | Status: SHIPPED | OUTPATIENT
Start: 2025-03-20

## 2025-03-20 RX ORDER — ARNICA MONTANA 1 [HP_X]/G
GEL TOPICAL
Qty: 30 G | Refills: 0
Start: 2025-03-20

## 2025-03-20 SDOH — ECONOMIC STABILITY: FOOD INSECURITY: WITHIN THE PAST 12 MONTHS, YOU WORRIED THAT YOUR FOOD WOULD RUN OUT BEFORE YOU GOT MONEY TO BUY MORE.: NEVER TRUE

## 2025-03-20 SDOH — ECONOMIC STABILITY: FOOD INSECURITY: WITHIN THE PAST 12 MONTHS, THE FOOD YOU BOUGHT JUST DIDN'T LAST AND YOU DIDN'T HAVE MONEY TO GET MORE.: NEVER TRUE

## 2025-03-20 NOTE — PROGRESS NOTES
Visit Information    Have you changed or started any medications since your last visit including any over-the-counter medicines, vitamins, or herbal medicines? no   Have you stopped taking any of your medications? Is so, why? -  no  Are you having any side effects from any of your medications? - no    Have you seen any other physician or provider since your last visit?  yes -    Have you had any other diagnostic tests since your last visit?  yes -    Have you been seen in the emergency room and/or had an admission in a hospital since we last saw you?  no   Have you had your routine dental cleaning in the past 6 months?  yes -      Do you have an active MyChart account? If no, what is the barrier?  Yes    Patient Care Team:  Jodie Escobar MD as PCP - General (Family Medicine)  Jodie Escobar MD as PCP - Empaneled Provider  Jasmina Bai APRN - CNM as Obstetrician (Certified Nurse Midwife)  Emiliano Montana MD as Surgeon (Orthopedic Surgery)    Medical History Review  Past Medical, Family, and Social History reviewed and does contribute to the patient presenting condition    Health Maintenance   Topic Date Due    Hepatitis B vaccine (1 of 3 - 19+ 3-dose series) Never done    Cervical cancer screen  08/26/2023    Flu vaccine (1) Never done    COVID-19 Vaccine (1 - 2024-25 season) Never done    Depression Screen  03/17/2026    DTaP/Tdap/Td vaccine (3 - Td or Tdap) 05/25/2030    Hepatitis C screen  Completed    HIV screen  Completed    Hepatitis A vaccine  Aged Out    Hib vaccine  Aged Out    HPV vaccine  Aged Out    Polio vaccine  Aged Out    Meningococcal (ACWY) vaccine  Aged Out    Meningococcal B vaccine  Aged Out    Pneumococcal 0-49 years Vaccine  Aged Out    A1C test (Diabetic or Prediabetic)  Discontinued    Varicella vaccine  Discontinued    Diabetes screen  Discontinued             
ibuprofen. She does not use Tylenol or Salonpas gel. The pain occasionally disrupts her sleep. She works night shifts.  Her left ankle pain started in April 2015, she underwent surgery due to fracture.    She has noticed a lump on the bottom of her right foot since yesterday, which she describes as similar to a blister. She reports no presence of warts on her body or her partner's body.    She reports no chest pain, shortness of breath, palpitations, leg swelling, abdominal pain, nausea, vomiting, or urinary issues. Her energy levels are satisfactory, and she does not experience fatigue. She continues to experience numbness and tingling in her left foot.     She has completed her course of vitamin D supplements. Her menstrual cycles are regular, with heavy flow for the first 3 days and a total duration of 9 days. She was informed that it would take 3 to 6 months for her periods to normalize. She has been drinking tea. She has not consulted a dermatologist for her dry skin.    She has been informed that she is not due for a Pap smear until next year per her GYN in [facility]. She has had the same partner since 2020. She has not received the hepatitis B vaccine. She uses vasectomy for contraception. She has 1 child and 2 stepchildren. She had her vision checked. She visited the dentist in February. She does not smoke or vape, and no one smokes around her.    She is worried about unintended weight gain and she wants to lose weight  SOCIAL HISTORY  She does not smoke or vape.    FAMILY HISTORY  Both parents have diabetes.    MEDICATIONS  Current: ibuprofen, Voltaren gel, see medication list below    IMMUNIZATIONS  She is due for hepatitis B vaccine.      There is unintended weight gain of 4 pounds in 6 months  Wt Readings from Last 3 Encounters:   03/20/25 83.3 kg (183 lb 9.6 oz)   09/26/24 81.2 kg (179 lb)   03/20/24 83 kg (183 lb)       Negative depression screening      3/17/2025    10:40 AM 9/26/2024     8:57 AM

## 2025-03-26 PROBLEM — N92.0 MENORRHAGIA WITH REGULAR CYCLE: Status: ACTIVE | Noted: 2025-03-26

## (undated) DEVICE — DISCONTINUED USE 405792 GLOVE SURG SENSICARE ALOE LT LF PF ST GRN SZ 7

## (undated) DEVICE — DRESSING TRNSPAR W5XL4.5IN FLM SHT SEMIPERMEABLE WIND

## (undated) DEVICE — SUTURE VCRL + SZ 4-0 L18IN ABSRB UD L19MM PS-2 3/8 CIR PRIM VCP496H

## (undated) DEVICE — SPONGE GZ W4XL4IN RAYON POLY FILL CVR W/ NONWOVEN FAB

## (undated) DEVICE — SOLUTION IV IRRIG POUR BRL 0.9% SODIUM CHL 2F7124

## (undated) DEVICE — STRIP,CLOSURE,WOUND,MEDI-STRIP,1/2X4: Brand: MEDLINE

## (undated) DEVICE — ST CHARLES MINOR ABDOMINAL PK: Brand: MEDLINE INDUSTRIES, INC.

## (undated) DEVICE — SINGLE PORT MANIFOLD: Brand: NEPTUNE 2

## (undated) DEVICE — GLOVE SURG SZ 65 STD WHT LTX SYN POLYMER BEAD REINF ANTI RL

## (undated) DEVICE — Device

## (undated) DEVICE — GAUZE,SPONGE,4"X4",16PLY,XRAY,STRL,LF: Brand: MEDLINE